# Patient Record
Sex: FEMALE | Race: WHITE | HISPANIC OR LATINO | Employment: FULL TIME | URBAN - METROPOLITAN AREA
[De-identification: names, ages, dates, MRNs, and addresses within clinical notes are randomized per-mention and may not be internally consistent; named-entity substitution may affect disease eponyms.]

---

## 2018-09-11 ENCOUNTER — TRANSCRIBE ORDERS (OUTPATIENT)
Dept: URGENT CARE | Age: 38
End: 2018-09-11

## 2018-09-11 ENCOUNTER — APPOINTMENT (OUTPATIENT)
Dept: URGENT CARE | Age: 38
End: 2018-09-11

## 2018-09-11 ENCOUNTER — APPOINTMENT (OUTPATIENT)
Dept: LAB | Age: 38
End: 2018-09-11

## 2018-09-11 DIAGNOSIS — Z02.1 PRE-EMPLOYMENT EXAMINATION: Primary | ICD-10-CM

## 2018-09-11 DIAGNOSIS — Z02.1 PRE-EMPLOYMENT EXAMINATION: ICD-10-CM

## 2018-09-11 LAB — RUBV IGG SERPL IA-ACNC: 23.9 IU/ML

## 2018-09-11 PROCEDURE — 86480 TB TEST CELL IMMUN MEASURE: CPT

## 2018-09-11 PROCEDURE — 86735 MUMPS ANTIBODY: CPT

## 2018-09-11 PROCEDURE — 86787 VARICELLA-ZOSTER ANTIBODY: CPT

## 2018-09-11 PROCEDURE — 86762 RUBELLA ANTIBODY: CPT

## 2018-09-11 PROCEDURE — 86765 RUBEOLA ANTIBODY: CPT

## 2018-09-11 PROCEDURE — 36415 COLL VENOUS BLD VENIPUNCTURE: CPT

## 2018-09-13 LAB
ANNOTATION COMMENT IMP: NORMAL
GAMMA INTERFERON BACKGROUND BLD IA-ACNC: 0.03 IU/ML
M TB IFN-G BLD-IMP: NEGATIVE
M TB IFN-G CD4+ BCKGRND COR BLD-ACNC: 0 IU/ML
M TB IFN-G CD4+ T-CELLS BLD-ACNC: 0.03 IU/ML
MEV IGG SER QL: ABNORMAL
MITOGEN IGNF BLD-ACNC: 6.46 IU/ML
MUV IGG SER QL: NORMAL
QUANTIFERON-TB GOLD IN TUBE: NORMAL
SERVICE CMNT-IMP: NORMAL
VZV IGG SER IA-ACNC: NORMAL

## 2019-04-02 ENCOUNTER — HOSPITAL ENCOUNTER (EMERGENCY)
Facility: HOSPITAL | Age: 39
Discharge: HOME | End: 2019-04-02
Attending: EMERGENCY MEDICINE | Admitting: EMERGENCY MEDICINE

## 2019-04-02 ENCOUNTER — APPOINTMENT (EMERGENCY)
Dept: RADIOLOGY | Facility: HOSPITAL | Age: 39
End: 2019-04-02

## 2019-04-02 VITALS
WEIGHT: 198.85 LBS | SYSTOLIC BLOOD PRESSURE: 115 MMHG | TEMPERATURE: 97.7 F | OXYGEN SATURATION: 96 % | HEART RATE: 58 BPM | DIASTOLIC BLOOD PRESSURE: 75 MMHG | RESPIRATION RATE: 18 BRPM

## 2019-04-02 DIAGNOSIS — S90.122A CONTUSION OF LESSER TOE OF LEFT FOOT WITHOUT DAMAGE TO NAIL, INITIAL ENCOUNTER: Primary | ICD-10-CM

## 2019-04-02 PROCEDURE — 99283 EMERGENCY DEPT VISIT LOW MDM: CPT | Performed by: EMERGENCY MEDICINE

## 2019-04-02 PROCEDURE — 73630 X-RAY EXAM OF FOOT: CPT

## 2019-04-02 PROCEDURE — 99283 EMERGENCY DEPT VISIT LOW MDM: CPT

## 2019-04-02 RX ORDER — ACETAMINOPHEN 325 MG/1
650 TABLET ORAL ONCE
Status: COMPLETED | OUTPATIENT
Start: 2019-04-02 | End: 2019-04-02

## 2019-04-02 RX ADMIN — ACETAMINOPHEN 650 MG: 325 TABLET, FILM COATED ORAL at 10:43

## 2020-02-05 ENCOUNTER — HOSPITAL ENCOUNTER (EMERGENCY)
Facility: HOSPITAL | Age: 40
Discharge: HOME | End: 2020-02-05
Attending: EMERGENCY MEDICINE | Admitting: EMERGENCY MEDICINE
Payer: COMMERCIAL

## 2020-02-05 VITALS
TEMPERATURE: 99.3 F | RESPIRATION RATE: 18 BRPM | OXYGEN SATURATION: 100 % | SYSTOLIC BLOOD PRESSURE: 129 MMHG | DIASTOLIC BLOOD PRESSURE: 74 MMHG | WEIGHT: 207 LBS | HEART RATE: 79 BPM

## 2020-02-05 DIAGNOSIS — N39.0 UTI (URINARY TRACT INFECTION): Primary | ICD-10-CM

## 2020-02-05 DIAGNOSIS — R10.2 VAGINAL PAIN: ICD-10-CM

## 2020-02-05 LAB
BACTERIA UR QL AUTO: ABNORMAL /HPF
BILIRUB UR QL STRIP: NEGATIVE
CLARITY UR: CLEAR
COLOR UR: YELLOW
EXT PREG TEST URINE: NEGATIVE
EXT. CONTROL ED NAV: NORMAL
GLUCOSE UR STRIP-MCNC: NEGATIVE MG/DL
HGB UR QL STRIP.AUTO: NEGATIVE
KETONES UR STRIP-MCNC: NEGATIVE MG/DL
LEUKOCYTE ESTERASE UR QL STRIP: ABNORMAL
NITRITE UR QL STRIP: NEGATIVE
NON-SQ EPI CELLS URNS QL MICRO: ABNORMAL /HPF
PH UR STRIP.AUTO: 6 [PH]
PROT UR STRIP-MCNC: NEGATIVE MG/DL
RBC #/AREA URNS AUTO: ABNORMAL /HPF
SP GR UR STRIP.AUTO: 1.02 (ref 1–1.03)
UROBILINOGEN UR QL STRIP.AUTO: 0.2 E.U./DL
WBC #/AREA URNS AUTO: ABNORMAL /HPF

## 2020-02-05 PROCEDURE — 99283 EMERGENCY DEPT VISIT LOW MDM: CPT | Performed by: PHYSICIAN ASSISTANT

## 2020-02-05 PROCEDURE — 81025 URINE PREGNANCY TEST: CPT | Performed by: PHYSICIAN ASSISTANT

## 2020-02-05 PROCEDURE — 87529 HSV DNA AMP PROBE: CPT | Performed by: PHYSICIAN ASSISTANT

## 2020-02-05 PROCEDURE — 36415 COLL VENOUS BLD VENIPUNCTURE: CPT | Performed by: PHYSICIAN ASSISTANT

## 2020-02-05 PROCEDURE — 87491 CHLMYD TRACH DNA AMP PROBE: CPT | Performed by: PHYSICIAN ASSISTANT

## 2020-02-05 PROCEDURE — 81001 URINALYSIS AUTO W/SCOPE: CPT | Performed by: PHYSICIAN ASSISTANT

## 2020-02-05 PROCEDURE — 87086 URINE CULTURE/COLONY COUNT: CPT | Performed by: PHYSICIAN ASSISTANT

## 2020-02-05 PROCEDURE — 99283 EMERGENCY DEPT VISIT LOW MDM: CPT

## 2020-02-05 PROCEDURE — 87591 N.GONORRHOEAE DNA AMP PROB: CPT | Performed by: PHYSICIAN ASSISTANT

## 2020-02-05 RX ORDER — CEPHALEXIN 250 MG/1
500 CAPSULE ORAL ONCE
Status: COMPLETED | OUTPATIENT
Start: 2020-02-05 | End: 2020-02-05

## 2020-02-05 RX ORDER — CEPHALEXIN 500 MG/1
500 CAPSULE ORAL EVERY 12 HOURS SCHEDULED
Qty: 14 CAPSULE | Refills: 0 | Status: SHIPPED | OUTPATIENT
Start: 2020-02-05 | End: 2020-02-12

## 2020-02-05 RX ORDER — IBUPROFEN 400 MG/1
400 TABLET ORAL EVERY 6 HOURS PRN
Qty: 12 TABLET | Refills: 0 | Status: SHIPPED | OUTPATIENT
Start: 2020-02-05 | End: 2020-02-08

## 2020-02-05 RX ORDER — PHENAZOPYRIDINE HYDROCHLORIDE 200 MG/1
200 TABLET, FILM COATED ORAL 3 TIMES DAILY
Qty: 6 TABLET | Refills: 0 | Status: SHIPPED | OUTPATIENT
Start: 2020-02-05 | End: 2020-02-07

## 2020-02-05 RX ADMIN — CEPHALEXIN 500 MG: 250 CAPSULE ORAL at 22:11

## 2020-02-06 LAB — BACTERIA UR CULT: NORMAL

## 2020-02-06 NOTE — ED PROVIDER NOTES
History  Chief Complaint   Patient presents with    Vaginal Pain     Pt states "I have a cyst on my lady part " Noticed discomfort on sunday when her period ended  Looked at the area for the first time today  Denies drainage  Denies fevers  Patient is a 40-year-old female with no significant past medical surgical history that presents emergency department with left vaginal discomfort 4 days prior to current ED presentation  Patient denies associated symptomatology  Patient denies vaginal discharge or vaginal bleeding  Patient denies dysuria  Patient verbalizes that she is currently sexually active only with her female partner  Patent reports that she has left-sided vaginal discomfort which she characterizes as intermittent throbbing  Patient denies history current ED presentation left-sided vaginal pain  Patient denies history of abscess  Patient denies palliative factors and affirms provacative factors of pressure of left side of vagina  Patient denies fevers, chills nausea, vomiting, diarrhea, constipation, urinary symptoms  Patient denies recent fall or recent trauma  Patient denies sick contacts recent travel  Patient recent medications  Patient denies chest pain or shortness of breath, and abdominal pain  Vaginal Pain   Location:  Left side of vagina  Severity:  Mild  Onset quality:  Gradual  Duration:  4 days  Timing:  Constant  Progression:  Unchanged  Chronicity:  New  Context:  Discomfort  Relieved by:  Nothing  Worsened by:  Sitting; pressure on left labia  Associated symptoms: no abdominal pain, no chest pain, no congestion, no cough, no diarrhea, no ear pain, no fatigue, no fever, no headaches, no loss of consciousness, no myalgias, no nausea, no rash, no rhinorrhea, no shortness of breath, no sore throat, no vomiting and no wheezing        None       History reviewed  No pertinent past medical history  History reviewed  No pertinent surgical history  History reviewed   No pertinent family history  I have reviewed and agree with the history as documented  Social History     Tobacco Use    Smoking status: Current Every Day Smoker     Types: Cigarettes    Smokeless tobacco: Never Used   Substance Use Topics    Alcohol use: Yes     Frequency: Never     Comment: Socailly    Drug use: Never        Review of Systems   Constitutional: Negative for activity change, appetite change, chills, fatigue and fever  HENT: Negative for congestion, ear pain, postnasal drip, rhinorrhea, sinus pressure, sinus pain, sore throat and tinnitus  Eyes: Negative for photophobia and visual disturbance  Respiratory: Negative for cough, chest tightness, shortness of breath and wheezing  Cardiovascular: Negative for chest pain and palpitations  Gastrointestinal: Negative for abdominal pain, constipation, diarrhea, nausea and vomiting  Genitourinary: Positive for vaginal pain  Negative for difficulty urinating, dysuria, flank pain, frequency and urgency  Musculoskeletal: Negative for back pain, gait problem, myalgias, neck pain and neck stiffness  Skin: Negative for pallor and rash  Allergic/Immunologic: Negative for environmental allergies and food allergies  Neurological: Negative for dizziness, loss of consciousness, weakness, numbness and headaches  Psychiatric/Behavioral: Negative for confusion  All other systems reviewed and are negative  Physical Exam  Physical Exam   Constitutional: She is oriented to person, place, and time  She appears well-developed and well-nourished  She is active and cooperative  Non-toxic appearance  She does not have a sickly appearance  She does not appear ill  HENT:   Head: Normocephalic and atraumatic  Right Ear: Hearing and external ear normal  No drainage, swelling or tenderness  No mastoid tenderness  No decreased hearing is noted  Left Ear: Hearing and external ear normal  No drainage, swelling or tenderness  No mastoid tenderness  No decreased hearing is noted  Nose: Nose normal    Mouth/Throat: Uvula is midline, oropharynx is clear and moist and mucous membranes are normal    Eyes: Pupils are equal, round, and reactive to light  Conjunctivae, EOM and lids are normal  Right eye exhibits no discharge  Left eye exhibits no discharge  Neck: Trachea normal, normal range of motion, full passive range of motion without pain and phonation normal  Neck supple  No JVD present  No tracheal tenderness, no spinous process tenderness and no muscular tenderness present  No neck rigidity  No tracheal deviation and normal range of motion present  Cardiovascular: Normal rate, regular rhythm, normal heart sounds, intact distal pulses and normal pulses  Pulses:       Radial pulses are 2+ on the right side, and 2+ on the left side  Posterior tibial pulses are 2+ on the right side, and 2+ on the left side  Pulmonary/Chest: Effort normal and breath sounds normal  No stridor  She has no decreased breath sounds  She has no wheezes  She has no rhonchi  She has no rales  She exhibits no tenderness, no bony tenderness and no crepitus  Abdominal: Soft  Bowel sounds are normal  She exhibits no distension  There is no tenderness  There is no rigidity, no rebound, no guarding and no CVA tenderness  Hernia confirmed negative in the right inguinal area and confirmed negative in the left inguinal area  Genitourinary: Rectum normal and vagina normal  Pelvic exam was performed with patient supine  No labial fusion  There is no tenderness, lesion or injury on the right labia  There is no tenderness, lesion or injury on the left labia  Cervix exhibits no motion tenderness and no friability  No erythema, tenderness or bleeding in the vagina  No foreign body in the vagina  No signs of injury around the vagina  No vaginal discharge found  Genitourinary Comments: Sarah TREVIZO RN as chaperone during  exam   Musculoskeletal: Normal range of motion  Lymphadenopathy:        Head (right side): No submental, no submandibular, no tonsillar, no preauricular, no posterior auricular and no occipital adenopathy present  Head (left side): No submental, no submandibular, no tonsillar, no preauricular, no posterior auricular and no occipital adenopathy present  She has no cervical adenopathy  Right cervical: No superficial cervical, no deep cervical and no posterior cervical adenopathy present  Left cervical: No superficial cervical, no deep cervical and no posterior cervical adenopathy present  Neurological: She is alert and oriented to person, place, and time  She has normal strength and normal reflexes  No sensory deficit  GCS eye subscore is 4  GCS verbal subscore is 5  GCS motor subscore is 6  Reflex Scores:       Patellar reflexes are 2+ on the right side and 2+ on the left side  Skin: Skin is warm and intact  Capillary refill takes less than 2 seconds  She is not diaphoretic  Psychiatric: She has a normal mood and affect  Her speech is normal and behavior is normal  Judgment and thought content normal  Cognition and memory are normal    Nursing note and vitals reviewed        Vital Signs  ED Triage Vitals [02/05/20 1829]   Temperature Pulse Respirations Blood Pressure SpO2   99 3 °F (37 4 °C) 79 18 129/74 100 %      Temp Source Heart Rate Source Patient Position - Orthostatic VS BP Location FiO2 (%)   Oral Monitor Sitting Left arm --      Pain Score       6           Vitals:    02/05/20 1829   BP: 129/74   Pulse: 79   Patient Position - Orthostatic VS: Sitting         Visual Acuity      ED Medications  Medications   cephalexin (KEFLEX) capsule 500 mg (500 mg Oral Given 2/5/20 2211)       Diagnostic Studies  Results Reviewed     Procedure Component Value Units Date/Time    Urine Microscopic [[id number]]  (Abnormal) Collected:  02/05/20 2059    Lab Status:  Final result Specimen:  Urine, Clean Catch Updated:  02/05/20 2133     RBC, UA 4-10 /hpf      WBC, UA 10-20 /hpf      Epithelial Cells Moderate /hpf      Bacteria, UA Moderate /hpf     Urine culture [[id number]] Collected:  02/05/20 2059    Lab Status: In process Specimen:  Urine, Clean Catch Updated:  02/05/20 2133    UA w Reflex to Microscopic w Reflex to Culture [[id number]]  (Abnormal) Collected:  02/05/20 2059    Lab Status:  Final result Specimen:  Urine, Clean Catch Updated:  02/05/20 2123     Color, UA Yellow     Clarity, UA Clear     Specific Gravity, UA 1 025     pH, UA 6 0     Leukocytes, UA Small     Nitrite, UA Negative     Protein, UA Negative mg/dl      Glucose, UA Negative mg/dl      Ketones, UA Negative mg/dl      Urobilinogen, UA 0 2 E U /dl      Bilirubin, UA Negative     Blood, UA Negative    Chlamydia/GC amplified DNA by PCR [[id number]] Collected:  02/05/20 2059    Lab Status: In process Specimen:  Genital from Cervix Updated:  02/05/20 2109    HSV TYPE 1,2 DNA PCR [[id number]] Collected:  02/05/20 2059    Lab Status:   In process Specimen:  Blood from Arm, Right Updated:  02/05/20 2109    POCT pregnancy, urine [[id number]]  (Normal) Resulted:  02/05/20 2104    Lab Status:  Final result Updated:  02/05/20 2104     EXT PREG TEST UR (Ref: Negative) negative     Control valid                 No orders to display              Procedures  Procedures         ED Course  ED Course as of Feb 05 2221   Wed Feb 05, 2020 1953 6/10 pain - vaginal pain  5 days prior to current ED presentation; "I think that I have a boil on the left side of my vagina "  lmp- ended on Sunday "only lasted 4 days"      1955 +nothing; -pressure and sitting      1958 Patient reports that she does not have an OB/GYN currently                                  MDM  Number of Diagnoses or Management Options  UTI (urinary tract infection): new and does not require workup  Vaginal pain: new and does not require workup     Amount and/or Complexity of Data Reviewed  Clinical lab tests: ordered and reviewed  Review and summarize past medical records: yes    Risk of Complications, Morbidity, and/or Mortality  Presenting problems: low  Diagnostic procedures: low  Management options: low    Patient Progress  Patient progress: stable       Patient is a 20-year-old female with no significant past medical surgical history that presents emergency department with left vaginal discomfort 4 days prior to current ED presentation  Patient denies associated symptomatology  Patient denies vaginal discharge or vaginal bleeding  Patient normotensive; afebrile   exam performed with Sarah ED RN chaperone; benign  Urine pregnancy negative  Urinalysis with likely urinary tract infection; WBC 10-20, epithelial and bacterial cells moderate amount  4-10 RBC, patient verbalized recently ending LMP  GC chlamydia swab in process  Each HSV type 1, 2 DNA PCR in process  Prescribed Keflex, Pyridium, Motrin counseled patient medication administration and side effects  Follow-up with OBGYN  Follow-up with PCP  Follow up emergency department symptoms persist or exacerbate  Patient demonstrates verbal understanding all clinical laboratory findings, discharge instructions, follow-up with verbalized agreement current treatment plan  Disposition  Final diagnoses:   UTI (urinary tract infection)   Vaginal pain     Time reflects when diagnosis was documented in both MDM as applicable and the Disposition within this note     Time User Action Codes Description Comment    2/5/2020 10:15 PM Christine Gould Add [N39 0] UTI (urinary tract infection)     2/5/2020 10:15 PM Christine Gould Add [R10 2] Vaginal pain       ED Disposition     ED Disposition Condition Date/Time Comment    Discharge Stable Wed Feb 5, 2020 10:14 PM [address] Emili Kenny discharge to home/self care              Follow-up Information     Follow up With Specialties Details Why Contact Info Additional [address] [facility] Obstetrics and Gynecology Call in 1 week for further evaluation of symptoms [address]  [place] [phone]  [phone] [place] [address] [facility], [address], [address] [place], [place], [address] Natalia Swanson MD Family Medicine, Obstetrics and Gynecology, Obstetrics, Gynecology Call in 1 week for further evaluation of symptoms [facility] [phone]       [address] Emergency Department Emergency Medicine Go to  As needed [address] [address]  [phone] [facility] ED, [address], [place], [place], [address]          Patient's Medications   Discharge Prescriptions    CEPHALEXIN (KEFLEX) 500 MG CAPSULE    Take 1 capsule (500 mg total) by mouth every 12 (twelve) hours for 7 days       Start Date: 2/5/2020  End Date: 2/12/2020       Order Dose: 500 mg       Quantity: 14 capsule    Refills: 0    IBUPROFEN (MOTRIN) 400 MG TABLET    Take 1 tablet (400 mg total) by mouth every 6 (six) hours as needed for mild pain for up to 3 days       Start Date: 2/5/2020  End Date: 2/8/2020       Order Dose: 400 mg       Quantity: 12 tablet    Refills: 0    PHENAZOPYRIDINE (PYRIDIUM) 200 MG TABLET    Take 1 tablet (200 mg total) by mouth 3 (three) times a day for 2 days       Start Date: 2/5/2020  End Date: 2/7/2020       Order Dose: 200 mg       Quantity: 6 tablet    Refills: 0     No discharge procedures on file      ED Provider  Electronically Signed by           Houston Morillo PA-C  02/05/20 2069

## 2020-02-07 LAB
C TRACH DNA SPEC QL NAA+PROBE: NEGATIVE
N GONORRHOEA DNA SPEC QL NAA+PROBE: NEGATIVE

## 2020-02-10 LAB
HSV1 DNA SPEC QL NAA+PROBE: NEGATIVE
HSV2 DNA SPEC QL NAA+PROBE: NEGATIVE

## 2020-02-26 ENCOUNTER — HOSPITAL ENCOUNTER (EMERGENCY)
Facility: HOSPITAL | Age: 40
Discharge: HOME | End: 2020-02-26
Attending: EMERGENCY MEDICINE | Admitting: EMERGENCY MEDICINE
Payer: COMMERCIAL

## 2020-02-26 VITALS
SYSTOLIC BLOOD PRESSURE: 120 MMHG | WEIGHT: 205.03 LBS | TEMPERATURE: 98.7 F | HEART RATE: 77 BPM | OXYGEN SATURATION: 98 % | DIASTOLIC BLOOD PRESSURE: 76 MMHG | RESPIRATION RATE: 17 BRPM

## 2020-02-26 DIAGNOSIS — K52.9 ACUTE GASTROENTERITIS: Primary | ICD-10-CM

## 2020-02-26 DIAGNOSIS — J02.9 PHARYNGITIS, ACUTE: ICD-10-CM

## 2020-02-26 LAB
BACTERIA UR QL AUTO: ABNORMAL /HPF
BILIRUB UR QL STRIP: NEGATIVE
CLARITY UR: CLEAR
COLOR UR: YELLOW
EXT PREG TEST URINE: NEGATIVE
EXT. CONTROL ED NAV: NORMAL
GLUCOSE UR STRIP-MCNC: NEGATIVE MG/DL
HGB UR QL STRIP.AUTO: ABNORMAL
KETONES UR STRIP-MCNC: NEGATIVE MG/DL
LEUKOCYTE ESTERASE UR QL STRIP: ABNORMAL
MUCOUS THREADS UR QL AUTO: ABNORMAL
NITRITE UR QL STRIP: NEGATIVE
NON-SQ EPI CELLS URNS QL MICRO: ABNORMAL /HPF
PH UR STRIP.AUTO: 5.5 [PH] (ref 4.5–8)
PROT UR STRIP-MCNC: NEGATIVE MG/DL
RBC #/AREA URNS AUTO: ABNORMAL /HPF
SP GR UR STRIP.AUTO: 1.02 (ref 1–1.03)
UROBILINOGEN UR QL STRIP.AUTO: 0.2 E.U./DL
WBC #/AREA URNS AUTO: ABNORMAL /HPF

## 2020-02-26 PROCEDURE — 99284 EMERGENCY DEPT VISIT MOD MDM: CPT

## 2020-02-26 PROCEDURE — 81025 URINE PREGNANCY TEST: CPT | Performed by: EMERGENCY MEDICINE

## 2020-02-26 PROCEDURE — 81001 URINALYSIS AUTO W/SCOPE: CPT

## 2020-02-26 PROCEDURE — 99285 EMERGENCY DEPT VISIT HI MDM: CPT | Performed by: EMERGENCY MEDICINE

## 2020-02-26 RX ORDER — ONDANSETRON 4 MG/1
4 TABLET, ORALLY DISINTEGRATING ORAL ONCE
Status: COMPLETED | OUTPATIENT
Start: 2020-02-26 | End: 2020-02-26

## 2020-02-26 RX ORDER — ONDANSETRON 4 MG/1
4 TABLET, ORALLY DISINTEGRATING ORAL EVERY 6 HOURS PRN
Qty: 20 TABLET | Refills: 0 | Status: SHIPPED | OUTPATIENT
Start: 2020-02-26

## 2020-02-26 RX ADMIN — DEXAMETHASONE SODIUM PHOSPHATE 10 MG: 10 INJECTION, SOLUTION INTRAMUSCULAR; INTRAVENOUS at 07:24

## 2020-02-26 RX ADMIN — ONDANSETRON 4 MG: 4 TABLET, ORALLY DISINTEGRATING ORAL at 07:23

## 2020-02-26 NOTE — ED PROVIDER NOTES
History  Chief Complaint   Patient presents with    Abdominal Pain     c/o generalized abdominal discomfort, N/V, body aches x 2 days  Pt denies fever/chills, or UA/BM complications  Pt recently completed course of antibiotics 2 weeks ago for a UTI     51-year-old female presents to the emergency department for evaluation of nausea with vomiting, generalized body aches and sore throat  Patient states that she woke up Monday morning with nausea and several episodes of nonbloody, nonbilious vomiting  She also had loose stool  She was able to go to work but did not feel well  She has had generalized body aches and fatigue  Yesterday she tolerated a bland diet but developed a mild sore throat  She has not had vomiting since Monday  She does continue with soft stools  Patient's significant other now has a similar GI symptoms  Patient denies dysuria or hematuria  She was recently treated for urinary tract infection but feels that her symptoms have resolved  She is spotting and expects her menses to start tomorrow  Patient denies headache  No ear pain  No nasal congestion  No cough or shortness of breath  Patient has generalized abdominal discomfort but denies focal pain  Patient did have nausea this morning  She is requesting a work note for today        History provided by:  Patient and medical records   used: No    Abdominal Pain   Pain location:  Generalized  Pain quality: dull    Pain radiates to:  Does not radiate  Pain severity:  Mild  Onset quality:  Gradual  Duration:  2 days  Timing:  Constant  Progression:  Improving  Chronicity:  New  Context: recent illness and sick contacts    Context: not diet changes, not suspicious food intake and not trauma    Relieved by:  Nothing  Worsened by:  Nothing  Ineffective treatments:  None tried  Associated symptoms: diarrhea, fatigue, nausea, sore throat and vomiting    Associated symptoms: no anorexia, no chest pain, no chills, no constipation, no cough, no dysuria, no fever, no flatus, no hematemesis and no hematochezia    Risk factors: not pregnant and no recent hospitalization        Prior to Admission Medications   Prescriptions Last Dose Informant Patient Reported? Taking?   ibuprofen (MOTRIN) 400 mg tablet   No No   Sig: Take 1 tablet (400 mg total) by mouth every 6 (six) hours as needed for mild pain for up to 3 days      Facility-Administered Medications: None       History reviewed  No pertinent past medical history  History reviewed  No pertinent surgical history  History reviewed  No pertinent family history  I have reviewed and agree with the history as documented  E-Cigarette/Vaping    E-Cigarette Use Never User      E-Cigarette/Vaping Substances     Social History     Tobacco Use    Smoking status: Current Every Day Smoker     Packs/day: 0 50     Types: Cigarettes    Smokeless tobacco: Never Used   Substance Use Topics    Alcohol use: Yes     Frequency: Never     Comment: Socailly    Drug use: Never       Review of Systems   Constitutional: Positive for fatigue  Negative for chills and fever  HENT: Positive for sore throat  Negative for congestion and ear pain  Respiratory: Negative for cough, choking and chest tightness  Cardiovascular: Negative for chest pain  Gastrointestinal: Positive for abdominal pain, diarrhea, nausea and vomiting  Negative for anorexia, constipation, flatus, hematemesis and hematochezia  Genitourinary: Negative for dysuria and flank pain  Neurological: Negative for weakness  All other systems reviewed and are negative  Physical Exam  Physical Exam   Constitutional: She is oriented to person, place, and time  She appears well-developed and well-nourished  She does not appear ill  No distress  HENT:   Head: Normocephalic  Nose: Nose normal    Mouth/Throat: Posterior oropharyngeal erythema present  No oropharyngeal exudate  Tonsils are 2+ on the right   Tonsils are 2+ on the left  No tonsillar exudate  Eyes: Pupils are equal, round, and reactive to light  Conjunctivae and EOM are normal    Neck: Normal range of motion  Neck supple  Cardiovascular: Normal rate, regular rhythm, normal heart sounds and intact distal pulses  Pulmonary/Chest: Effort normal and breath sounds normal    Abdominal: Soft  Bowel sounds are normal  She exhibits no distension  There is no tenderness  There is no rebound, no guarding, no tenderness at McBurney's point and negative Gilbert's sign  Musculoskeletal: Normal range of motion  She exhibits no edema, tenderness or deformity  Lymphadenopathy:     She has no cervical adenopathy  Neurological: She is alert and oriented to person, place, and time  She has normal strength and normal reflexes  No cranial nerve deficit or sensory deficit  She exhibits normal muscle tone  Coordination and gait normal    Skin: Skin is warm, dry and intact  No rash noted  Psychiatric: She has a normal mood and affect  Her behavior is normal  Judgment and thought content normal    Nursing note and vitals reviewed        Vital Signs  ED Triage Vitals [02/26/20 0615]   Temperature Pulse Respirations Blood Pressure SpO2   98 7 °F (37 1 °C) 77 17 120/76 98 %      Temp Source Heart Rate Source Patient Position - Orthostatic VS BP Location FiO2 (%)   Oral Monitor Lying Right arm --      Pain Score       4           Vitals:    02/26/20 0615   BP: 120/76   Pulse: 77   Patient Position - Orthostatic VS: Lying         Visual Acuity      ED Medications  Medications   ondansetron (ZOFRAN-ODT) dispersible tablet 4 mg (4 mg Oral Given 2/26/20 0723)   dexamethasone 10 mg/mL oral liquid 10 mg 1 mL (10 mg Oral Given 2/26/20 0724)       Diagnostic Studies  Results Reviewed     Procedure Component Value Units Date/Time    Urine Microscopic [[id number]]  (Abnormal) Collected:  02/26/20 0735    Lab Status:  Final result Specimen:  Urine, Clean Catch Updated:  02/26/20 0759     RBC, UA 0-1 /hpf      WBC, UA 4-10 /hpf      Epithelial Cells Moderate /hpf      Bacteria, UA Occasional /hpf      MUCUS THREADS Occasional    POCT pregnancy, urine [[id number]]  (Normal) Resulted:  02/26/20 0736    Lab Status:  Final result Updated:  02/26/20 0736     EXT PREG TEST UR (Ref: Negative) NEGATIVE     Control VALID    Urine Macroscopic, POC [[id number]]  (Abnormal) Collected:  02/26/20 0735    Lab Status:  Final result Specimen:  Urine Updated:  02/26/20 0729     Color, UA Yellow     Clarity, UA Clear     pH, UA 5 5     Leukocytes, UA Small     Nitrite, UA Negative     Protein, UA Negative mg/dl      Glucose, UA Negative mg/dl      Ketones, UA Negative mg/dl      Urobilinogen, UA 0 2 E U /dl      Bilirubin, UA Negative     Blood, UA Moderate     Specific [place], UA 1 020    Narrative:       CLINITEK RESULT                 No orders to display              Procedures  Procedures         ED Course  ED Course as of Mar 02 1612   Wed Feb 26, 2020   0740 Patient resting comfortably  She has tolerated oral intake after taking Zofran  She feels comfortable with discharge  MDM  Number of Diagnoses or Management Options  Acute gastroenteritis: new and requires workup  Pharyngitis, acute: new and requires workup     Amount and/or Complexity of Data Reviewed  Clinical lab tests: ordered and reviewed  Decide to obtain previous medical records or to obtain history from someone other than the patient: yes  Independent visualization of images, tracings, or specimens: yes    Risk of Complications, Morbidity, and/or Mortality  General comments: 80-year-old female presents for nausea vomiting sore throat body aches  Patient admits that her symptoms are actually improving  She does not feel well enough to go to work today  Will treat symptoms with Zofran and dexamethasone  We discussed signs and symptoms to return to the emergency department      Patient Progress  Patient progress: improved        Disposition  Final diagnoses:   Acute gastroenteritis   Pharyngitis, acute     Time reflects when diagnosis was documented in both MDM as applicable and the Disposition within this note     Time User Action Codes Description Comment    2/26/2020  7:40 AM Yulissa Foley [K52 9] Acute gastroenteritis     2/26/2020  7:40 AM Yulissa Foley [J02 9] Pharyngitis, acute       ED Disposition     ED Disposition Condition Date/Time Comment    Discharge Stable Wed Feb 26, 2020  7:40 AM Asia Yanez discharge to home/self care  Follow-up Information     Follow up With Specialties Details Why Contact Info    Donna Ochoa MD Family Medicine, Obstetrics and Gynecology, Obstetrics, Gynecology Schedule an appointment as soon as possible for a visit in 2 days For recheck of current symptoms, As needed [address] [place] [address]  [phone]            Discharge Medication List as of 2/26/2020  7:49 AM      START taking these medications    Details   ondansetron (ZOFRAN-ODT) 4 mg disintegrating tablet Take 1 tablet (4 mg total) by mouth every 6 (six) hours as needed for nausea or vomiting, Starting Wed 2/26/2020, Normal         CONTINUE these medications which have NOT CHANGED    Details   ibuprofen (MOTRIN) 400 mg tablet Take 1 tablet (400 mg total) by mouth every 6 (six) hours as needed for mild pain for up to 3 days, Starting Wed 2/5/2020, Until Sat 2/8/2020, Normal           No discharge procedures on file      PDMP Review     None          ED Provider  Electronically Signed by           Hortencia Alan DO  03/02/20 5911

## 2020-10-22 ENCOUNTER — HOSPITAL ENCOUNTER (EMERGENCY)
Facility: HOSPITAL | Age: 40
Discharge: HOME | End: 2020-10-22
Attending: EMERGENCY MEDICINE | Admitting: EMERGENCY MEDICINE
Payer: COMMERCIAL

## 2020-10-22 VITALS
RESPIRATION RATE: 18 BRPM | HEART RATE: 93 BPM | OXYGEN SATURATION: 100 % | DIASTOLIC BLOOD PRESSURE: 71 MMHG | TEMPERATURE: 98.4 F | SYSTOLIC BLOOD PRESSURE: 127 MMHG

## 2020-10-22 DIAGNOSIS — M54.9 MID BACK PAIN: Primary | ICD-10-CM

## 2020-10-22 DIAGNOSIS — M54.50 ACUTE MIDLINE LOW BACK PAIN WITHOUT SCIATICA: ICD-10-CM

## 2020-10-22 PROCEDURE — 99283 EMERGENCY DEPT VISIT LOW MDM: CPT

## 2020-10-22 PROCEDURE — 99282 EMERGENCY DEPT VISIT SF MDM: CPT | Performed by: EMERGENCY MEDICINE

## 2020-10-22 RX ORDER — ACETAMINOPHEN 325 MG/1
650 TABLET ORAL ONCE
Status: COMPLETED | OUTPATIENT
Start: 2020-10-22 | End: 2020-10-22

## 2020-10-22 RX ORDER — IBUPROFEN 400 MG/1
400 TABLET ORAL ONCE
Status: COMPLETED | OUTPATIENT
Start: 2020-10-22 | End: 2020-10-22

## 2020-10-22 RX ORDER — LIDOCAINE 50 MG/G
2 PATCH TOPICAL ONCE
Status: DISCONTINUED | OUTPATIENT
Start: 2020-10-22 | End: 2020-10-23 | Stop reason: HOSPADM

## 2020-10-22 RX ADMIN — LIDOCAINE 5% 2 PATCH: 700 PATCH TOPICAL at 22:31

## 2020-10-22 RX ADMIN — IBUPROFEN 400 MG: 400 TABLET ORAL at 22:31

## 2020-10-22 RX ADMIN — ACETAMINOPHEN 650 MG: 325 TABLET, FILM COATED ORAL at 22:31

## 2021-12-09 ENCOUNTER — HOSPITAL ENCOUNTER (EMERGENCY)
Facility: HOSPITAL | Age: 41
Discharge: HOME | End: 2021-12-09
Attending: EMERGENCY MEDICINE | Admitting: EMERGENCY MEDICINE
Payer: COMMERCIAL

## 2021-12-09 ENCOUNTER — APPOINTMENT (EMERGENCY)
Dept: RADIOLOGY | Facility: HOSPITAL | Age: 41
End: 2021-12-09
Payer: COMMERCIAL

## 2021-12-09 VITALS
RESPIRATION RATE: 14 BRPM | WEIGHT: 214 LBS | HEART RATE: 78 BPM | SYSTOLIC BLOOD PRESSURE: 123 MMHG | TEMPERATURE: 98.6 F | DIASTOLIC BLOOD PRESSURE: 79 MMHG | OXYGEN SATURATION: 98 % | HEIGHT: 61 IN | BODY MASS INDEX: 40.4 KG/M2

## 2021-12-09 DIAGNOSIS — N39.0 UTI (URINARY TRACT INFECTION): Primary | ICD-10-CM

## 2021-12-09 DIAGNOSIS — R50.9 FEVER: ICD-10-CM

## 2021-12-09 DIAGNOSIS — R05.9 COUGH: ICD-10-CM

## 2021-12-09 DIAGNOSIS — U07.1 COVID-19: ICD-10-CM

## 2021-12-09 LAB
ALBUMIN SERPL BCP-MCNC: 3.1 G/DL (ref 3.5–5)
ALP SERPL-CCNC: 84 U/L (ref 46–116)
ALT SERPL W P-5'-P-CCNC: 22 U/L (ref 12–78)
ANION GAP SERPL CALCULATED.3IONS-SCNC: 5 MMOL/L (ref 4–13)
AST SERPL W P-5'-P-CCNC: 16 U/L (ref 5–45)
BACTERIA UR QL AUTO: ABNORMAL /HPF
BASOPHILS # BLD AUTO: 0.02 THOUSANDS/ΜL (ref 0–0.1)
BASOPHILS NFR BLD AUTO: 0 % (ref 0–1)
BILIRUB SERPL-MCNC: 0.14 MG/DL (ref 0.2–1)
BILIRUB UR QL STRIP: NEGATIVE
BUN SERPL-MCNC: 12 MG/DL (ref 5–25)
CALCIUM ALBUM COR SERPL-MCNC: 9 MG/DL (ref 8.3–10.1)
CALCIUM SERPL-MCNC: 8.3 MG/DL (ref 8.3–10.1)
CHLORIDE SERPL-SCNC: 108 MMOL/L (ref 100–108)
CLARITY UR: CLEAR
CO2 SERPL-SCNC: 24 MMOL/L (ref 21–32)
COLOR UR: YELLOW
COLOR, POC: NORMAL
CREAT SERPL-MCNC: 0.85 MG/DL (ref 0.6–1.3)
EOSINOPHIL # BLD AUTO: 0.01 THOUSAND/ΜL (ref 0–0.61)
EOSINOPHIL NFR BLD AUTO: 0 % (ref 0–6)
ERYTHROCYTE [DISTWIDTH] IN BLOOD BY AUTOMATED COUNT: 17.2 % (ref 11.6–15.1)
EXT PREG TEST URINE: NEGATIVE
EXT. CONTROL ED NAV: NORMAL
FLUAV RNA RESP QL NAA+PROBE: NEGATIVE
FLUBV RNA RESP QL NAA+PROBE: NEGATIVE
GFR SERPL CREATININE-BSD FRML MDRD: 85 ML/MIN/1.73SQ M
GLUCOSE SERPL-MCNC: 84 MG/DL (ref 65–140)
GLUCOSE UR STRIP-MCNC: NEGATIVE MG/DL
HCT VFR BLD AUTO: 35.6 % (ref 34.8–46.1)
HGB BLD-MCNC: 12.1 G/DL (ref 11.5–15.4)
HGB UR QL STRIP.AUTO: ABNORMAL
IMM GRANULOCYTES # BLD AUTO: 0.02 THOUSAND/UL (ref 0–0.2)
IMM GRANULOCYTES NFR BLD AUTO: 0 % (ref 0–2)
KETONES UR STRIP-MCNC: NEGATIVE MG/DL
LEUKOCYTE ESTERASE UR QL STRIP: ABNORMAL
LYMPHOCYTES # BLD AUTO: 0.94 THOUSANDS/ΜL (ref 0.6–4.47)
LYMPHOCYTES NFR BLD AUTO: 16 % (ref 14–44)
MCH RBC QN AUTO: 26.5 PG (ref 26.8–34.3)
MCHC RBC AUTO-ENTMCNC: 34 G/DL (ref 31.4–37.4)
MCV RBC AUTO: 78 FL (ref 82–98)
MONOCYTES # BLD AUTO: 0.96 THOUSAND/ΜL (ref 0.17–1.22)
MONOCYTES NFR BLD AUTO: 17 % (ref 4–12)
NEUTROPHILS # BLD AUTO: 3.85 THOUSANDS/ΜL (ref 1.85–7.62)
NEUTS SEG NFR BLD AUTO: 67 % (ref 43–75)
NITRITE UR QL STRIP: NEGATIVE
NON-SQ EPI CELLS URNS QL MICRO: ABNORMAL /HPF
NRBC BLD AUTO-RTO: 0 /100 WBCS
PH UR STRIP.AUTO: 5.5 [PH] (ref 4.5–8)
PLATELET # BLD AUTO: 331 THOUSANDS/UL (ref 149–390)
PMV BLD AUTO: 9.8 FL (ref 8.9–12.7)
POTASSIUM SERPL-SCNC: 3.5 MMOL/L (ref 3.5–5.3)
PROT SERPL-MCNC: 7.4 G/DL (ref 6.4–8.2)
PROT UR STRIP-MCNC: ABNORMAL MG/DL
RBC # BLD AUTO: 4.56 MILLION/UL (ref 3.81–5.12)
RBC #/AREA URNS AUTO: ABNORMAL /HPF
RSV RNA RESP QL NAA+PROBE: NEGATIVE
SARS-COV-2 RNA RESP QL NAA+PROBE: POSITIVE
SODIUM SERPL-SCNC: 137 MMOL/L (ref 136–145)
SP GR UR STRIP.AUTO: >=1.03 (ref 1–1.03)
UROBILINOGEN UR QL STRIP.AUTO: 0.2 E.U./DL
WBC # BLD AUTO: 5.8 THOUSAND/UL (ref 4.31–10.16)
WBC #/AREA URNS AUTO: ABNORMAL /HPF

## 2021-12-09 PROCEDURE — 96374 THER/PROPH/DIAG INJ IV PUSH: CPT

## 2021-12-09 PROCEDURE — 71045 X-RAY EXAM CHEST 1 VIEW: CPT

## 2021-12-09 PROCEDURE — 81025 URINE PREGNANCY TEST: CPT | Performed by: EMERGENCY MEDICINE

## 2021-12-09 PROCEDURE — 99284 EMERGENCY DEPT VISIT MOD MDM: CPT | Performed by: EMERGENCY MEDICINE

## 2021-12-09 PROCEDURE — 0241U HB NFCT DS VIR RESP RNA 4 TRGT: CPT | Performed by: EMERGENCY MEDICINE

## 2021-12-09 PROCEDURE — 81001 URINALYSIS AUTO W/SCOPE: CPT

## 2021-12-09 PROCEDURE — 87086 URINE CULTURE/COLONY COUNT: CPT

## 2021-12-09 PROCEDURE — 87086 URINE CULTURE/COLONY COUNT: CPT | Performed by: EMERGENCY MEDICINE

## 2021-12-09 PROCEDURE — 96361 HYDRATE IV INFUSION ADD-ON: CPT

## 2021-12-09 PROCEDURE — 36415 COLL VENOUS BLD VENIPUNCTURE: CPT | Performed by: EMERGENCY MEDICINE

## 2021-12-09 PROCEDURE — 85025 COMPLETE CBC W/AUTO DIFF WBC: CPT | Performed by: EMERGENCY MEDICINE

## 2021-12-09 PROCEDURE — 80053 COMPREHEN METABOLIC PANEL: CPT | Performed by: EMERGENCY MEDICINE

## 2021-12-09 PROCEDURE — 99284 EMERGENCY DEPT VISIT MOD MDM: CPT

## 2021-12-09 RX ORDER — IBUPROFEN 400 MG/1
400 TABLET ORAL ONCE
Status: COMPLETED | OUTPATIENT
Start: 2021-12-09 | End: 2021-12-09

## 2021-12-09 RX ORDER — ONDANSETRON 2 MG/ML
4 INJECTION INTRAMUSCULAR; INTRAVENOUS ONCE
Status: COMPLETED | OUTPATIENT
Start: 2021-12-09 | End: 2021-12-09

## 2021-12-09 RX ORDER — CEPHALEXIN 250 MG/1
500 CAPSULE ORAL ONCE
Status: COMPLETED | OUTPATIENT
Start: 2021-12-09 | End: 2021-12-09

## 2021-12-09 RX ORDER — CEPHALEXIN 500 MG/1
500 CAPSULE ORAL EVERY 8 HOURS SCHEDULED
Qty: 30 CAPSULE | Refills: 0 | Status: SHIPPED | OUTPATIENT
Start: 2021-12-09 | End: 2021-12-19

## 2021-12-09 RX ADMIN — IBUPROFEN 400 MG: 400 TABLET, FILM COATED ORAL at 10:17

## 2021-12-09 RX ADMIN — CEPHALEXIN 500 MG: 250 CAPSULE ORAL at 12:16

## 2021-12-09 RX ADMIN — SODIUM CHLORIDE 1000 ML: 0.9 INJECTION, SOLUTION INTRAVENOUS at 09:53

## 2021-12-09 RX ADMIN — ONDANSETRON 4 MG: 2 INJECTION INTRAMUSCULAR; INTRAVENOUS at 10:17

## 2021-12-11 LAB
BACTERIA UR CULT: NORMAL
BACTERIA UR CULT: NORMAL

## 2022-08-05 ENCOUNTER — VBI (OUTPATIENT)
Dept: ADMINISTRATIVE | Facility: OTHER | Age: 42
End: 2022-08-05

## 2022-10-13 ENCOUNTER — HOSPITAL ENCOUNTER (EMERGENCY)
Facility: HOSPITAL | Age: 42
Discharge: HOME | End: 2022-10-13
Attending: EMERGENCY MEDICINE
Payer: COMMERCIAL

## 2022-10-13 ENCOUNTER — APPOINTMENT (EMERGENCY)
Dept: RADIOLOGY | Facility: HOSPITAL | Age: 42
End: 2022-10-13
Payer: COMMERCIAL

## 2022-10-13 VITALS
RESPIRATION RATE: 18 BRPM | DIASTOLIC BLOOD PRESSURE: 58 MMHG | TEMPERATURE: 98.7 F | OXYGEN SATURATION: 100 % | SYSTOLIC BLOOD PRESSURE: 105 MMHG | HEART RATE: 72 BPM

## 2022-10-13 DIAGNOSIS — K63.89 EPIPLOIC APPENDAGITIS: Primary | ICD-10-CM

## 2022-10-13 DIAGNOSIS — R10.32 LEFT LOWER QUADRANT ABDOMINAL PAIN: ICD-10-CM

## 2022-10-13 LAB
ALBUMIN SERPL BCP-MCNC: 3.3 G/DL (ref 3.5–5)
ALP SERPL-CCNC: 90 U/L (ref 46–116)
ALT SERPL W P-5'-P-CCNC: 23 U/L (ref 12–78)
ANION GAP SERPL CALCULATED.3IONS-SCNC: 3 MMOL/L (ref 4–13)
AST SERPL W P-5'-P-CCNC: 26 U/L (ref 5–45)
BACTERIA UR QL AUTO: ABNORMAL /HPF
BASOPHILS # BLD AUTO: 0.04 THOUSANDS/ΜL (ref 0–0.1)
BASOPHILS NFR BLD AUTO: 0 % (ref 0–1)
BILIRUB SERPL-MCNC: 0.41 MG/DL (ref 0.2–1)
BILIRUB UR QL STRIP: NEGATIVE
BUN SERPL-MCNC: 7 MG/DL (ref 5–25)
CALCIUM ALBUM COR SERPL-MCNC: 9.4 MG/DL (ref 8.3–10.1)
CALCIUM SERPL-MCNC: 8.8 MG/DL (ref 8.3–10.1)
CHLORIDE SERPL-SCNC: 112 MMOL/L (ref 96–108)
CLARITY UR: ABNORMAL
CO2 SERPL-SCNC: 23 MMOL/L (ref 21–32)
COLOR UR: YELLOW
CREAT SERPL-MCNC: 0.85 MG/DL (ref 0.6–1.3)
EOSINOPHIL # BLD AUTO: 0.15 THOUSAND/ΜL (ref 0–0.61)
EOSINOPHIL NFR BLD AUTO: 1 % (ref 0–6)
ERYTHROCYTE [DISTWIDTH] IN BLOOD BY AUTOMATED COUNT: 18.5 % (ref 11.6–15.1)
EXT PREG TEST URINE: NEGATIVE
EXT. CONTROL ED NAV: NORMAL
GFR SERPL CREATININE-BSD FRML MDRD: 84 ML/MIN/1.73SQ M
GLUCOSE SERPL-MCNC: 101 MG/DL (ref 65–140)
GLUCOSE UR STRIP-MCNC: NEGATIVE MG/DL
HCT VFR BLD AUTO: 33.7 % (ref 34.8–46.1)
HGB BLD-MCNC: 11.2 G/DL (ref 11.5–15.4)
HGB UR QL STRIP.AUTO: NEGATIVE
IMM GRANULOCYTES # BLD AUTO: 0.03 THOUSAND/UL (ref 0–0.2)
IMM GRANULOCYTES NFR BLD AUTO: 0 % (ref 0–2)
KETONES UR STRIP-MCNC: ABNORMAL MG/DL
LEUKOCYTE ESTERASE UR QL STRIP: ABNORMAL
LYMPHOCYTES # BLD AUTO: 1.63 THOUSANDS/ΜL (ref 0.6–4.47)
LYMPHOCYTES NFR BLD AUTO: 16 % (ref 14–44)
MAGNESIUM SERPL-MCNC: 2.1 MG/DL (ref 1.6–2.6)
MCH RBC QN AUTO: 24.8 PG (ref 26.8–34.3)
MCHC RBC AUTO-ENTMCNC: 33.2 G/DL (ref 31.4–37.4)
MCV RBC AUTO: 75 FL (ref 82–98)
MONOCYTES # BLD AUTO: 0.58 THOUSAND/ΜL (ref 0.17–1.22)
MONOCYTES NFR BLD AUTO: 6 % (ref 4–12)
MUCOUS THREADS UR QL AUTO: ABNORMAL
NEUTROPHILS # BLD AUTO: 8.11 THOUSANDS/ΜL (ref 1.85–7.62)
NEUTS SEG NFR BLD AUTO: 77 % (ref 43–75)
NITRITE UR QL STRIP: NEGATIVE
NON-SQ EPI CELLS URNS QL MICRO: ABNORMAL /HPF
NRBC BLD AUTO-RTO: 0 /100 WBCS
PH UR STRIP.AUTO: 6 [PH]
PLATELET # BLD AUTO: 470 THOUSANDS/UL (ref 149–390)
PMV BLD AUTO: 9.3 FL (ref 8.9–12.7)
POTASSIUM SERPL-SCNC: 3.7 MMOL/L (ref 3.5–5.3)
PROT SERPL-MCNC: 7.7 G/DL (ref 6.4–8.4)
PROT UR STRIP-MCNC: ABNORMAL MG/DL
RBC # BLD AUTO: 4.51 MILLION/UL (ref 3.81–5.12)
RBC #/AREA URNS AUTO: ABNORMAL /HPF
SODIUM SERPL-SCNC: 138 MMOL/L (ref 135–147)
SP GR UR STRIP.AUTO: 1.02 (ref 1–1.03)
UROBILINOGEN UR STRIP-ACNC: <2 MG/DL
WBC # BLD AUTO: 10.54 THOUSAND/UL (ref 4.31–10.16)
WBC #/AREA URNS AUTO: ABNORMAL /HPF

## 2022-10-13 PROCEDURE — 87086 URINE CULTURE/COLONY COUNT: CPT

## 2022-10-13 PROCEDURE — 83735 ASSAY OF MAGNESIUM: CPT

## 2022-10-13 PROCEDURE — 85025 COMPLETE CBC W/AUTO DIFF WBC: CPT

## 2022-10-13 PROCEDURE — 80053 COMPREHEN METABOLIC PANEL: CPT

## 2022-10-13 PROCEDURE — 96360 HYDRATION IV INFUSION INIT: CPT

## 2022-10-13 PROCEDURE — 74177 CT ABD & PELVIS W/CONTRAST: CPT

## 2022-10-13 PROCEDURE — 36415 COLL VENOUS BLD VENIPUNCTURE: CPT

## 2022-10-13 PROCEDURE — 87591 N.GONORRHOEAE DNA AMP PROB: CPT

## 2022-10-13 PROCEDURE — 81001 URINALYSIS AUTO W/SCOPE: CPT

## 2022-10-13 PROCEDURE — 99284 EMERGENCY DEPT VISIT MOD MDM: CPT

## 2022-10-13 PROCEDURE — 81025 URINE PREGNANCY TEST: CPT

## 2022-10-13 PROCEDURE — 87491 CHLMYD TRACH DNA AMP PROBE: CPT

## 2022-10-13 PROCEDURE — G1004 CDSM NDSC: HCPCS

## 2022-10-13 RX ADMIN — SODIUM CHLORIDE 1000 ML: 0.9 INJECTION, SOLUTION INTRAVENOUS at 09:54

## 2022-10-13 RX ADMIN — IOHEXOL 95 ML: 350 INJECTION, SOLUTION INTRAVENOUS at 11:29

## 2022-10-13 NOTE — DISCHARGE INSTRUCTIONS
CT abdomen pelvis with contrast: Acute epiploic appendagitis related to the sigmoid colon  This is a benign self-limited inflammatory process, usually requiring only conservative management , Suspected myomatous uterus  Consider follow-up ultrasound for further evaluation  ,

## 2022-10-13 NOTE — Clinical Note
Tejal Morgan was seen and treated in our emergency department on 10/13/2022  No restrictions            Diagnosis:     Diomara    She may return on this date: If you have any questions or concerns, please don't hesitate to call        Jody Fontana PA-C    ______________________________           _______________          _______________  Hospital Representative                              Date                                Time

## 2022-10-13 NOTE — ED PROVIDER NOTES
History  Chief Complaint   Patient presents with   • Abdominal Pain     States pain feels like on L "edge of gut" / L lower pelvis; states came on suddently 10/12 while at work, hurts with movement; states feels like needs to pee, but only a little comes out; states no changes to urine colour     Patient is a 49-year-old female coming in for complaint of left lower quadrant abdominal pain  Patient is in no acute distress at this time, states that she does have a feeling needed P, but no dysuria, no hematuria, no nausea, no vomiting, no significant past medical history  No history of diverticulitis, no hematochezia, no diarrhea      History provided by:  Patient   used: No    Abdominal Pain  Pain location:  LLQ  Pain severity:  Mild  Duration:  1 day  Associated symptoms: no chest pain, no chills, no diarrhea, no dysuria, no fatigue, no fever, no flatus, no hematochezia, no hematuria, no melena, no nausea, no shortness of breath and no vomiting        Prior to Admission Medications   Prescriptions Last Dose Informant Patient Reported? Taking?   ibuprofen (MOTRIN) 400 mg tablet   No No   Sig: Take 1 tablet (400 mg total) by mouth every 6 (six) hours as needed for mild pain for up to 3 days   ondansetron (ZOFRAN-ODT) 4 mg disintegrating tablet   No No   Sig: Take 1 tablet (4 mg total) by mouth every 6 (six) hours as needed for nausea or vomiting      Facility-Administered Medications: None       History reviewed  No pertinent past medical history  History reviewed  No pertinent surgical history  History reviewed  No pertinent family history  I have reviewed and agree with the history as documented      E-Cigarette/Vaping   • E-Cigarette Use Never User      E-Cigarette/Vaping Substances     Social History     Tobacco Use   • Smoking status: Current Every Day Smoker     Packs/day: 0 50     Types: Cigarettes   • Smokeless tobacco: Current User   Vaping Use   • Vaping Use: Never used   Substance Use Topics   • Alcohol use: Yes     Comment: Socailly   • Drug use: Never       Review of Systems   Constitutional: Negative  Negative for chills, fatigue and fever  HENT: Negative  Eyes: Negative  Respiratory: Negative  Negative for chest tightness and shortness of breath  Cardiovascular: Negative  Negative for chest pain  Gastrointestinal: Positive for abdominal pain  Negative for blood in stool, diarrhea, flatus, hematochezia, melena, nausea and vomiting  Genitourinary: Negative  Negative for decreased urine volume, dysuria, flank pain, hematuria and urgency  Musculoskeletal: Negative  Skin: Negative  Neurological: Negative  Psychiatric/Behavioral: Negative  Physical Exam  Physical Exam  Vitals reviewed  Constitutional:       Appearance: Normal appearance  She is normal weight  HENT:      Head: Normocephalic and atraumatic  Right Ear: External ear normal       Left Ear: External ear normal       Nose: Nose normal    Eyes:      Conjunctiva/sclera: Conjunctivae normal    Cardiovascular:      Rate and Rhythm: Normal rate  Pulmonary:      Effort: Pulmonary effort is normal    Abdominal:      Palpations: Abdomen is soft  Tenderness: There is abdominal tenderness in the left lower quadrant  There is no guarding  Comments: No tenderness on palpation of the suprapubic region   Musculoskeletal:         General: Normal range of motion  Cervical back: Normal range of motion  Skin:     General: Skin is warm and dry  Neurological:      Mental Status: She is alert           Vital Signs  ED Triage Vitals   Temperature Pulse Respirations Blood Pressure SpO2   10/13/22 0848 10/13/22 0848 10/13/22 0848 10/13/22 0848 10/13/22 0848   98 7 °F (37 1 °C) 82 18 129/60 97 %      Temp Source Heart Rate Source Patient Position - Orthostatic VS BP Location FiO2 (%)   10/13/22 0848 10/13/22 0848 10/13/22 1144 10/13/22 1144 --   Oral Monitor Lying Right arm       Pain Score 10/13/22 0848       7           Vitals:    10/13/22 0848 10/13/22 1144   BP: 129/60 105/58   Pulse: 82 72   Patient Position - Orthostatic VS:  Lying         Visual Acuity      ED Medications  Medications   sodium chloride 0 9 % bolus 1,000 mL (0 mL Intravenous Stopped 10/13/22 1054)   iohexol (OMNIPAQUE) 350 MG/ML injection (SINGLE-DOSE) 100 mL (95 mL Intravenous Given 10/13/22 1129)       Diagnostic Studies  Results Reviewed     Procedure Component Value Units Date/Time    Chlamydia/GC amplified DNA by PCR [[id number]] Collected: 10/13/22 1134    Lab Status: In process Specimen: Urine, Other Updated: 10/13/22 1139    Trichomonas vaginalis/Mycoplasma genitalium PCR [[id number]]     Lab Status: No result Specimen: Urine, Clean Catch     Urine Microscopic [[id number]]  (Abnormal) Collected: 10/13/22 0954    Lab Status: Final result Specimen: Urine, Clean Catch Updated: 10/13/22 1015     RBC, UA 1-2 /hpf      WBC, UA Innumerable /hpf      Epithelial Cells Occasional /hpf      Bacteria, UA None Seen /hpf      MUCUS THREADS Moderate    Urine culture [[id number]] Collected: 10/13/22 0954    Lab Status:  In process Specimen: Urine, Clean Catch Updated: 10/13/22 1015    UA w Reflex to Microscopic w Reflex to Culture [[id number]]  (Abnormal) Collected: 10/13/22 0954    Lab Status: Final result Specimen: Urine, Clean Catch Updated: 10/13/22 1012     Color, UA Yellow     Clarity, UA Turbid     Specific [place], UA 1 023     pH, UA 6 0     Leukocytes, UA Large     Nitrite, UA Negative     Protein, UA Trace mg/dl      Glucose, UA Negative mg/dl      Ketones, UA 10 (1+) mg/dl      Urobilinogen, UA <2 0 mg/dl      Bilirubin, UA Negative     Occult Blood, UA Negative    POCT pregnancy, urine [[id number]]  (Normal) Resulted: 10/13/22 1010    Lab Status: Final result Updated: 10/13/22 1010     EXT PREG TEST UR (Ref: Negative) negative     Control valid    Comprehensive metabolic panel [[id number]]  (Abnormal) Collected: 10/13/22 0910 Lab Status: Final result Specimen: Blood from Arm, Left Updated: 10/13/22 0947     Sodium 138 mmol/L      Potassium 3 7 mmol/L      Chloride 112 mmol/L      CO2 23 mmol/L      ANION GAP 3 mmol/L      BUN 7 mg/dL      Creatinine 0 85 mg/dL      Glucose 101 mg/dL      Calcium 8 8 mg/dL      Corrected Calcium 9 4 mg/dL      AST 26 U/L      ALT 23 U/L      Alkaline Phosphatase 90 U/L      Total Protein 7 7 g/dL      Albumin 3 3 g/dL      Total Bilirubin 0 41 mg/dL      eGFR 84 ml/min/1 73sq m     Narrative:      National Kidney Disease Foundation guidelines for Chronic Kidney Disease (CKD):   •  Stage 1 with normal or high GFR (GFR > 90 mL/min/1 73 square meters)  •  Stage 2 Mild CKD (GFR = 60-89 mL/min/1 73 square meters)  •  Stage 3A Moderate CKD (GFR = 45-59 mL/min/1 73 square meters)  •  Stage 3B Moderate CKD (GFR = 30-44 mL/min/1 73 square meters)  •  Stage 4 Severe CKD (GFR = 15-29 mL/min/1 73 square meters)  •  Stage 5 End Stage CKD (GFR <15 mL/min/1 73 square meters)  Note: GFR calculation is accurate only with a steady state creatinine    Magnesium [[id number]]  (Normal) Collected: 10/13/22 0910    Lab Status: Final result Specimen: Blood from Arm, Left Updated: 10/13/22 0947     Magnesium 2 1 mg/dL     CBC and differential [[id number]]  (Abnormal) Collected: 10/13/22 0910    Lab Status: Final result Specimen: Blood from Arm, Left Updated: 10/13/22 0928     WBC 10 54 Thousand/uL      RBC 4 51 Million/uL      Hemoglobin 11 2 g/dL      Hematocrit 33 7 %      MCV 75 fL      MCH 24 8 pg      MCHC 33 2 g/dL      RDW 18 5 %      MPV 9 3 fL      Platelets 590 Thousands/uL      nRBC 0 /100 WBCs      Neutrophils Relative 77 %      Immat GRANS % 0 %      Lymphocytes Relative 16 %      Monocytes Relative 6 %      Eosinophils Relative 1 %      Basophils Relative 0 %      Neutrophils Absolute 8 11 Thousands/µL      Immature Grans Absolute 0 03 Thousand/uL      Lymphocytes Absolute 1 63 Thousands/µL      Monocytes Absolute 0 58 Thousand/µL      Eosinophils Absolute 0 15 Thousand/µL      Basophils Absolute 0 04 Thousands/µL                  CT abdomen pelvis with contrast   Final Result by Soumya Velazco MD (10/13 1150)      Acute epiploic appendagitis related to the sigmoid colon  This is a benign self-limited inflammatory process, usually requiring only conservative management  Suspected myomatous uterus  Consider follow-up ultrasound for further evaluation  The study was marked in [facility] for immediate notification and follow-up regarding the uterine findings  Workstation performed: [id number]                    Procedures  Procedures         ED Course  ED Course as of 10/13/22 1356   Thu Oct 13, 2022   1013 Leukocytes, UA(!): Large   1016 WBC, UA(!): Innumerable   1152 CT abdomen pelvis with contrast   1153 CT abdomen pelvis with contrast  Acute epiploic appendagitis related to the sigmoid colon  This is a benign self-limited inflammatory process, usually requiring only conservative management      Suspected myomatous uterus  Consider follow-up ultrasound for further evaluation  MDM  Number of Diagnoses or Management Options  Epiploic appendagitis: new and does not require workup  Left lower quadrant abdominal pain: new and does not require workup  Diagnosis management comments: Patient comes in for complaint of left lower quadrant abdominal pain  Patient is in no acute distress this time  No tenderness on palpation of the suprapubic  Patient does have an epiploic appendagitis on CT  I did advise patient that she has an extremely elevated white count, but no bacteria seen, patient continues to deny dysuria, hematuria  Patient states that she is not concerned for STDs at this time, due to no bacteria being seen but an elevated white count, I did order a gonorrhea chlamydia test, which will take 24-72 hours, which I advised patient    I offered the option to treat for STDs at this time, however patient stated that she would like to wait until testing came back  Will not start UTI treatment at this time, as patient has no clinical symptoms, and has no bacteria on UA    Counseling: I had a detailed discussion with the patient and/or guardian regarding: the historical points, exam findings, and any diagnostic results supporting the discharge diagnosis, lab results, radiology results, discharge instructions reviewed with patient and/or family/caregiver and understanding was verbalized  Instructions given to return to the emergency department if symptoms worsen or persist, or if there are any questions or concerns that arise at home       All labs reviewed and utilized in the medical decision making process     All radiology studies independently viewed by me and interpreted by the radiologist     Portions of the record may have been created with voice recognition software   Occasional wrong word or "sound a like" substitutions may have occurred due to the inherent limitations of voice recognition software   Read the chart carefully and recognize, using context, where substitutions have occurred           Amount and/or Complexity of Data Reviewed  Clinical lab tests: ordered and reviewed  Tests in the radiology section of CPT®: ordered and reviewed    Risk of Complications, Morbidity, and/or Mortality  Presenting problems: low  Diagnostic procedures: minimal  Management options: minimal    Patient Progress  Patient progress: stable      Disposition  Final diagnoses:   Epiploic appendagitis   Left lower quadrant abdominal pain     Time reflects when diagnosis was documented in both MDM as applicable and the Disposition within this note     Time User Action Codes Description Comment    10/13/2022 12:02 PM Ginette Saint Add [K63 89] Epiploic appendagitis     10/13/2022 12:02 PM Ginette Saint Add [R10 32] Left lower quadrant abdominal pain       ED Disposition     ED Disposition   Discharge    Condition   Stable    Date/Time   Thu Oct 13, 2022 12:02 PM    Comment   [place] discharge to home/self care  Follow-up Information     Follow up With Specialties Details Why Contact Info Additional Information    Virgen Tapia MD Family Medicine, Obstetrics and Gynecology, Obstetrics, Gynecology   [address] [place] [address]  [address]  Emergency Department Emergency Medicine  As needed, If symptoms worsen [address] [id number] [address] Emergency Department, [address], [address] [facility] [address]          Discharge Medication List as of 10/13/2022 12:03 PM      CONTINUE these medications which have NOT CHANGED    Details   ibuprofen (MOTRIN) 400 mg tablet Take 1 tablet (400 mg total) by mouth every 6 (six) hours as needed for mild pain for up to 3 days, Starting Wed 2/5/2020, Until Sat 2/8/2020, Normal      ondansetron (ZOFRAN-ODT) 4 mg disintegrating tablet Take 1 tablet (4 mg total) by mouth every 6 (six) hours as needed for nausea or vomiting, Starting Wed 2/26/2020, Normal             No discharge procedures on file      PDMP Review     None          ED Provider  Electronically Signed by           Christophe Romo PA-C  10/13/22 3930

## 2022-10-14 LAB
BACTERIA UR CULT: NORMAL
C TRACH DNA SPEC QL NAA+PROBE: NEGATIVE
N GONORRHOEA DNA SPEC QL NAA+PROBE: NEGATIVE

## 2022-11-30 ENCOUNTER — HOSPITAL ENCOUNTER (EMERGENCY)
Facility: HOSPITAL | Age: 42
Discharge: HOME | End: 2022-11-30
Attending: EMERGENCY MEDICINE

## 2022-11-30 VITALS
OXYGEN SATURATION: 99 % | TEMPERATURE: 98.6 F | SYSTOLIC BLOOD PRESSURE: 138 MMHG | HEART RATE: 88 BPM | RESPIRATION RATE: 18 BRPM | DIASTOLIC BLOOD PRESSURE: 82 MMHG

## 2022-11-30 DIAGNOSIS — N76.0 ABSCESS OF VAGINA: Primary | ICD-10-CM

## 2022-11-30 LAB
AMORPH URATE CRY URNS QL MICRO: ABNORMAL
BACTERIA UR QL AUTO: ABNORMAL /HPF
BILIRUB UR QL STRIP: NEGATIVE
CLARITY UR: ABNORMAL
COLOR UR: YELLOW
GLUCOSE UR STRIP-MCNC: NEGATIVE MG/DL
HGB UR QL STRIP.AUTO: NEGATIVE
KETONES UR STRIP-MCNC: ABNORMAL MG/DL
LEUKOCYTE ESTERASE UR QL STRIP: NEGATIVE
MUCOUS THREADS UR QL AUTO: ABNORMAL
NITRITE UR QL STRIP: NEGATIVE
NON-SQ EPI CELLS URNS QL MICRO: ABNORMAL /HPF
PH UR STRIP.AUTO: 5.5 [PH]
PROT UR STRIP-MCNC: ABNORMAL MG/DL
RBC #/AREA URNS AUTO: ABNORMAL /HPF
SP GR UR STRIP.AUTO: 1.04 (ref 1–1.03)
UROBILINOGEN UR STRIP-ACNC: <2 MG/DL
WBC #/AREA URNS AUTO: ABNORMAL /HPF

## 2022-11-30 RX ORDER — CEPHALEXIN 500 MG/1
500 CAPSULE ORAL EVERY 12 HOURS SCHEDULED
Qty: 14 CAPSULE | Refills: 0 | Status: SHIPPED | OUTPATIENT
Start: 2022-11-30 | End: 2022-12-07

## 2022-11-30 RX ORDER — LORAZEPAM 1 MG/1
1 TABLET ORAL ONCE
Status: COMPLETED | OUTPATIENT
Start: 2022-11-30 | End: 2022-11-30

## 2022-11-30 RX ORDER — LIDOCAINE HYDROCHLORIDE 10 MG/ML
5 INJECTION, SOLUTION EPIDURAL; INFILTRATION; INTRACAUDAL; PERINEURAL ONCE
Status: COMPLETED | OUTPATIENT
Start: 2022-11-30 | End: 2022-11-30

## 2022-11-30 RX ADMIN — LIDOCAINE HYDROCHLORIDE 5 ML: 10 INJECTION, SOLUTION EPIDURAL; INFILTRATION; INTRACAUDAL; PERINEURAL at 02:03

## 2022-11-30 RX ADMIN — LORAZEPAM 1 MG: 1 TABLET ORAL at 02:41

## 2022-11-30 NOTE — ED ATTENDING ATTESTATION
11/29/2022  IWillis DO, saw and evaluated the patient  I have discussed the patient with the resident/non-physician practitioner and agree with the resident's/non-physician practitioner's findings, Plan of Care, and MDM as documented in the resident's/non-physician practitioner's note, except where noted  All available labs and Radiology studies were reviewed  I was present for key portions of any procedure(s) performed by the resident/non-physician practitioner and I was immediately available to provide assistance  At this point I agree with the current assessment done in the Emergency Department  I have conducted an independent evaluation of this patient a history and physical is as follows:    History provided by:  Patient  Vaginal Pain  Location:  Left labial swelling and pain   Severity:  Mild  Onset quality:  Gradual  Timing:  Constant  Progression:  Worsening  Chronicity:  New  Associated symptoms: no abdominal pain, no chest pain, no cough, no diarrhea, no fever, no headaches, no myalgias, no nausea, no rash, no rhinorrhea, no shortness of breath, no sore throat and no wheezing     are as follows /82   Pulse 88   Temp 98 6 °F (37 °C) (Oral)   Resp 18   SpO2 99%   Review of Systems Review of Systems   Constitutional: Negative for chills and fever  HENT: Negative for rhinorrhea, sore throat and trouble swallowing  Eyes: Negative for pain  Respiratory: Negative for cough, shortness of breath, wheezing and stridor  Cardiovascular: Negative for chest pain and leg swelling  Gastrointestinal: Negative for abdominal pain, diarrhea and nausea  Endocrine: Negative for polyuria  Genitourinary: Positive for vaginal pain  Negative for dysuria, flank pain and urgency  Musculoskeletal: Negative for joint swelling, myalgias and neck stiffness  Skin: Negative for rash  Allergic/Immunologic: Negative for immunocompromised state     Neurological: Negative for dizziness, syncope, weakness, numbness and headaches  Psychiatric/Behavioral: Negative for confusion and suicidal ideas  All other systems reviewed and are negative  Physical Exam remarkable for Physical Exam  Vitals and nursing note reviewed  Constitutional:       Appearance: Normal appearance  She is well-developed  HENT:      Head: Normocephalic and atraumatic  Nose: Nose normal       Mouth/Throat:      Mouth: Mucous membranes are moist    Eyes:      Extraocular Movements: Extraocular movements intact  Pupils: Pupils are equal, round, and reactive to light  Cardiovascular:      Rate and Rhythm: Normal rate and regular rhythm  Heart sounds: No murmur heard  No friction rub  Pulmonary:      Effort: No respiratory distress  Breath sounds: Normal breath sounds  No wheezing or rales  Abdominal:      General: Bowel sounds are normal  There is no distension  Palpations: Abdomen is soft  Tenderness: There is no abdominal tenderness  Genitourinary:     Vagina: No vaginal discharge  Musculoskeletal:         General: No tenderness  Normal range of motion  Cervical back: Normal range of motion and neck supple  Skin:     General: Skin is warm  Findings: No rash  Neurological:      Mental Status: She is alert and oriented to person, place, and time  Psychiatric:         Mood and Affect: Mood normal       Work up and treatment plan discussed with Treatment Team: Registered Nurse: Laya Mak RN; Registered Nurse: Sharmin Gutierrez RN; Resident: Babatunde Khoury MD and agreed upon plan  MDM  Number of Diagnoses or Management Options  Abscess of vagina: new, needed workup  Diagnosis management comments: 41-year-old female presents emergency department with left labial swelling and discomfort and pain    Concern for possible cyst at this point time/abscess and infection, patient was numbed up appropriately with lidocaine and stab incision made with 11 blade, slight amount of purulent drainage and bloody discharge removed at this point time will place on antibiotics for possible abscess last cellulitis, plan outpatient management followup 0 with OBGYN  Pt re-examined and evaluated after testing and treatment  Spoke with the patient and feeling improved and sxs have resolved  Will discharge home with close f/u with pcp and instructed to return to the ED if sxs worsen or continue  Pt agrees with the plan for discharge and feels comfortable to go home with proper f/u  Advised to return for worsening or additional problems  Diagnostic tests were reviewed and questions answered  Diagnosis, care plan and treatment options were discussed  The patient understand instructions and will follow up as directed  Counseling: I had a detailed discussion with the patient and/or guardian regarding: the historical points, exam findings, and any diagnostic results supporting the discharge diagnosis, lab results, radiology results, discharge instructions reviewed with patient and/or family/caregiver and understanding was verbalized  Instructions given to return to the emergency department if symptoms worsen or persist, or if there are any questions or concerns that arise at home  All labs reviewed and utilized in the medical decision making process    All radiology studies independently viewed by me and interpreted by the radiologist        Lab Results: Lab Results: I have personally reviewed pertinent lab results  Imaging: I have personally reviewed pertinent reports      EKG, Pathology, and Other Studies: I have personally reviewed pertinent films in PACS    Clinical Impression:    Final diagnoses:   Abscess of vagina         Disposition    discharged           New Prescriptions:    Discharge Medication List as of 11/30/2022  3:39 AM      START taking these medications    Details   cephalexin (KEFLEX) 500 mg capsule Take 1 capsule (500 mg total) by mouth every 12 (twelve) hours for 7 days, Starting Wed 11/30/2022, Until Wed 12/7/2022, Normal                  Follow-up Instructions:    [address] [place] [address] Emergency Department  [address] [id number] [phone]  Go to   If symptoms worsen    MD Natalia Kent [address] [place] [id number]  [phone]    Go to   If symptoms worsen    [address] [id number] [phone]  Schedule an appointment as soon as possible for a visit           ED Course         Critical Care Time  Procedures

## 2022-11-30 NOTE — ED PROVIDER NOTES
History  Chief Complaint   Patient presents with   • Vaginal Pain     Left labia minora is inflamed and painful since last week but worse tonight     Patient is 27-year-old female presenting for vaginal pain  No significant past medical history  Patient states approximately 3-4 days ago began experiencing worsening left-sided vaginal pain  Patient states she feels a small swelling by left labia minora  Patient denies any dysuria, oliguria  Patient states she is sexually active with 1 partner  Denies any concerns of STI  Patient denies any systemic symptoms, no fevers/chills, nausea/vomiting  On exam, patient has small 1-2 cm abscess/cyst on left vaginal wall  Is tender to palpation  No apparent drainage or pus  No other rash, cellulitic changes, or discharge noted  Rest of physical exam is benign  Prior to Admission Medications   Prescriptions Last Dose Informant Patient Reported? Taking?   ibuprofen (MOTRIN) 400 mg tablet   No No   Sig: Take 1 tablet (400 mg total) by mouth every 6 (six) hours as needed for mild pain for up to 3 days   ondansetron (ZOFRAN-ODT) 4 mg disintegrating tablet   No No   Sig: Take 1 tablet (4 mg total) by mouth every 6 (six) hours as needed for nausea or vomiting      Facility-Administered Medications: None       History reviewed  No pertinent past medical history  History reviewed  No pertinent surgical history  History reviewed  No pertinent family history  I have reviewed and agree with the history as documented  E-Cigarette/Vaping   • E-Cigarette Use Never User      E-Cigarette/Vaping Substances     Social History     Tobacco Use   • Smoking status: Every Day     Packs/day: 0 50     Types: Cigarettes   • Smokeless tobacco: Current   Vaping Use   • Vaping Use: Never used   Substance Use Topics   • Alcohol use: Yes     Comment: Socailly   • Drug use: Never        Review of Systems   Constitutional: Negative  HENT: Negative  Eyes: Negative  Respiratory: Negative  Cardiovascular: Negative  Gastrointestinal: Negative  Endocrine: Negative  Genitourinary:        Left-sided vaginal pain/swelling  Musculoskeletal: Negative  Skin: Negative  Allergic/Immunologic: Negative  Neurological: Negative  Hematological: Negative  Psychiatric/Behavioral: Negative  Physical Exam  ED Triage Vitals [11/30/22 0005]   Temperature Pulse Respirations Blood Pressure SpO2   98 6 °F (37 °C) 88 18 138/82 99 %      Temp Source Heart Rate Source Patient Position - Orthostatic VS BP Location FiO2 (%)   Oral -- -- -- --      Pain Score       8             Orthostatic Vital Signs  Vitals:    11/30/22 0005   BP: 138/82   Pulse: 88       Physical Exam  Vitals and nursing note reviewed  Constitutional:       Appearance: Normal appearance  She is obese  HENT:      Head: Normocephalic and atraumatic  Right Ear: Tympanic membrane, ear canal and external ear normal       Left Ear: Tympanic membrane, ear canal and external ear normal       Nose: Nose normal       Mouth/Throat:      Mouth: Mucous membranes are moist       Pharynx: Oropharynx is clear  Eyes:      Extraocular Movements: Extraocular movements intact  Conjunctiva/sclera: Conjunctivae normal       Pupils: Pupils are equal, round, and reactive to light  Cardiovascular:      Rate and Rhythm: Normal rate and regular rhythm  Pulses: Normal pulses  Heart sounds: Normal heart sounds  Pulmonary:      Effort: Pulmonary effort is normal       Breath sounds: Normal breath sounds  Abdominal:      General: Abdomen is flat  Bowel sounds are normal       Palpations: Abdomen is soft  Genitourinary:     Comments: Patient has small left-sided vaginal cyst/abscess  It is tender palpation, no apparent drainage or cellulitic changes  No other rashes or erythema noted on pelvic exam   Musculoskeletal:         General: Normal range of motion        Cervical back: Normal range of motion and neck supple  Skin:     General: Skin is warm and dry  Capillary Refill: Capillary refill takes less than 2 seconds  Neurological:      General: No focal deficit present  Mental Status: She is alert and oriented to person, place, and time  Psychiatric:         Mood and Affect: Mood normal          Behavior: Behavior normal          Thought Content: Thought content normal          Judgment: Judgment normal          ED Medications  Medications   lidocaine (PF) (XYLOCAINE-MPF) 1 % injection 5 mL (5 mL Infiltration Given by Other 11/30/22 0203)   LORazepam (ATIVAN) tablet 1 mg (1 mg Oral Given 11/30/22 0241)       Diagnostic Studies  Results Reviewed     Procedure Component Value Units Date/Time    Urine Microscopic [[id number]]  (Abnormal) Collected: 11/30/22 0229    Lab Status: Final result Specimen: Urine, Clean Catch Updated: 11/30/22 0431     RBC, UA 10-20 /hpf      WBC, UA 2-4 /hpf      Epithelial Cells Occasional /hpf      Bacteria, UA Occasional /hpf      MUCUS THREADS Innumerable     Amorphous Crystals, UA Occasional    UA (URINE) with reflex to Scope [[id number]]  (Abnormal) Collected: 11/30/22 0229    Lab Status: Final result Specimen: Urine, Clean Catch Updated: 11/30/22 0400     Color, UA Yellow     Clarity, UA Turbid     Specific [place], UA 1 040     pH, UA 5 5     Leukocytes, UA Negative     Nitrite, UA Negative     Protein, UA 30 (1+) mg/dl      Glucose, UA Negative mg/dl      Ketones, UA Trace mg/dl      Urobilinogen, UA <2 0 mg/dl      Bilirubin, UA Negative     Occult Blood, UA Negative    Chlamydia/GC amplified DNA by PCR [[id number]] Collected: 11/30/22 0229    Lab Status:  In process Specimen: Urine, Other Updated: 11/30/22 0233                 No orders to display         Procedures  Incision and drain    Date/Time: 11/30/2022 4:57 AM  Performed by: Justen Cisneros MD  Authorized by: Justen Cisneros MD   [place] Protocol:  Procedure performed by: (Dr La Romero)  Consent: Verbal consent obtained  Risks and benefits: risks, benefits and alternatives were discussed  Consent given by: patient  Time out: Immediately prior to procedure a "time out" was called to verify the correct patient, procedure, equipment, support staff and site/side marked as required  Timeout called at: 11/30/2022 2:22 AM   Patient understanding: patient states understanding of the procedure being performed  Patient consent: the patient's understanding of the procedure matches consent given  Procedure consent: procedure consent matches procedure scheduled  Relevant documents: relevant documents present and verified  Test results: test results available and properly labeled  Site marked: the operative site was marked  Radiology Images displayed and confirmed  If images not available, report reviewed: imaging studies available  Required items: required blood products, implants, devices, and special equipment available  Patient identity confirmed: verbally with patient and arm band      Patient location:  ED  Location:     Type:  Abscess    Size:  1-2 cm    Location:  Anogenital    Anogenital location: Vaginal   Sedation:     Sedation type: Patient given 1 mg Ativan for anxiety  Anesthesia (see MAR for exact dosages): Anesthesia method:  Local infiltration    Local anesthetic:  Lidocaine 1% w/o epi  Procedure details:     Complexity:  Simple    Needle aspiration: no      Incision types:  Stab incision    Aspiration type: puncture aspiration      Scalpel blade:  11    Approach:  Open    Incision depth:  Subcutaneous and submucosal    Wound management:  Probed and deloculated and extensive cleaning    Drainage:  Bloody    Drainage amount:  Scant    Wound treatment:  Wound left open    Packing materials:  None  Post-procedure details:     Patient tolerance of procedure:   Tolerated well, no immediate complications          ED Course  ED Course as of 11/30/22 0503   Wed Nov 30, 2022   0147 External pelvic exam done w/ chaperone - Rene Hiss  - Findings consistent w/ barthlin's cyst                             SBIRT 22yo+    Flowsheet Row Most Recent Value   SBIRT (23 yo +)    In order to provide better care to our patients, we are screening all of our patients for alcohol and drug use  Would it be okay to ask you these screening questions? No Filed at: 11/30/2022 0023                MDM  Number of Diagnoses or Management Options  Abscess of vagina: new and requires workup  Diagnosis management comments: Patient is 25-year-old female presenting for left vaginal abscess  Ddx: Vaginal Abscess, Bartholin cyst  Based on patient presentation and physical exam findings, concern for vaginal abscess or Bartholin's cyst   Will also obtain UA and GC chlamydia to evaluate for UTI/STI  Plan for I and D of abscess as well as plan for discharge with home course antibiotics and OBGYN follow-up is necessary  Ativan will be given for patient anxiety due to concerns over I and D process   -patient tolerated I and D well  Ready for discharge with 7 day course of antibiotics         Amount and/or Complexity of Data Reviewed  Clinical lab tests: ordered and reviewed  Review and summarize past medical records: yes  Independent visualization of images, tracings, or specimens: yes    Risk of Complications, Morbidity, and/or Mortality  Presenting problems: low  Diagnostic procedures: low  Management options: low    Patient Progress  Patient progress: stable      Disposition  Final diagnoses:   Abscess of vagina     Time reflects when diagnosis was documented in both MDM as applicable and the Disposition within this note     Time User Action Codes Description Comment    11/30/2022  1:43 AM Tenna Suazo Add [N75 0] Bartholin cyst     11/30/2022  3:36 AM Tenna Suazo Add [N76 0] Abscess of vagina     11/30/2022  3:36 AM Tenna Suazo Modify [N76 0] Abscess of vagina     11/30/2022  3:36 AM Tenna Suazo Remove [N75 0] Bartholin cyst       ED Disposition     ED Disposition   Discharge    Condition   Stable    Date/Time   Wed Nov 30, 2022  3:35 AM    Comment   Mat Mejia discharge to home/self care  Follow-up Information     Follow up With Specialties Details Why Contact Info Additional [address] Emergency Department Emergency Medicine Go to  If symptoms worsen [address] Emergency Department, [address], [address]    Mary Maldonado MD Family Medicine, Obstetrics and Gynecology, Obstetrics, Gynecology Go to  If symptoms worsen [address] [place] [address]  [address] Obstetrics and Gynecology Schedule an appointment as soon as possible for a visit   [facility]  [phone]           Discharge Medication List as of 11/30/2022  3:39 AM      START taking these medications    Details   cephalexin (KEFLEX) 500 mg capsule Take 1 capsule (500 mg total) by mouth every 12 (twelve) hours for 7 days, Starting Wed 11/30/2022, Until Wed 12/7/2022, Normal         CONTINUE these medications which have NOT CHANGED    Details   ibuprofen (MOTRIN) 400 mg tablet Take 1 tablet (400 mg total) by mouth every 6 (six) hours as needed for mild pain for up to 3 days, Starting Wed 2/5/2020, Until Sat 2/8/2020, Normal      ondansetron (ZOFRAN-ODT) 4 mg disintegrating tablet Take 1 tablet (4 mg total) by mouth every 6 (six) hours as needed for nausea or vomiting, Starting Wed 2/26/2020, Normal           No discharge procedures on file  PDMP Review     None           ED Provider  Attending physically available and evaluated Mat Mejia I managed the patient along with the ED Attending      Electronically Signed by         Isaias Caballero MD  11/30/22 1567

## 2022-11-30 NOTE — DISCHARGE INSTRUCTIONS
Please take antibiotics as prescribed (once in the morning and once at night for 7 days) and follow up with obgyn

## 2022-11-30 NOTE — Clinical Note
Christ[facility] was seen and treated in our emergency department on 11/29/2022  Diagnosis:     Steffany Perez  may return to work on return date  She may return on this date: 12/01/2022         If you have any questions or concerns, please don't hesitate to call        Jeison Calles MD    ______________________________           _______________          _______________  [facility] Representative                              Date                                Time

## 2022-12-01 LAB
C TRACH DNA SPEC QL NAA+PROBE: NEGATIVE
N GONORRHOEA DNA SPEC QL NAA+PROBE: NEGATIVE

## 2023-06-16 ENCOUNTER — VBI (OUTPATIENT)
Dept: ADMINISTRATIVE | Facility: OTHER | Age: 43
End: 2023-06-16

## 2023-08-30 ENCOUNTER — OFFICE VISIT (OUTPATIENT)
Dept: DENTISTRY | Facility: CLINIC | Age: 43
End: 2023-08-30

## 2023-08-30 VITALS — SYSTOLIC BLOOD PRESSURE: 115 MMHG | DIASTOLIC BLOOD PRESSURE: 66 MMHG | HEART RATE: 91 BPM

## 2023-08-30 DIAGNOSIS — Z98.890 HISTORY OF ORAL SURGERY: Primary | ICD-10-CM

## 2023-08-30 DIAGNOSIS — S02.5XXB OPEN FRACTURE OF TOOTH, INITIAL ENCOUNTER: ICD-10-CM

## 2023-08-30 PROCEDURE — D0220 INTRAORAL - PERIAPICAL FIRST RADIOGRAPHIC IMAGE: HCPCS

## 2023-08-30 PROCEDURE — D7140 EXTRACTION, ERUPTED TOOTH OR EXPOSED ROOT (ELEVATION AND/OR FORCEPS REMOVAL): HCPCS

## 2023-08-30 RX ORDER — IBUPROFEN 800 MG/1
800 TABLET ORAL EVERY 6 HOURS PRN
Qty: 28 TABLET | Refills: 0 | Status: SHIPPED | OUTPATIENT
Start: 2023-08-30 | End: 2023-09-06

## 2023-08-30 NOTE — PROGRESS NOTES
Virgie Paige presented for limited evaluation and extraction #4 retained root tips  CC: I broke my tooth a few weeks ago and I think some of it is still in my gums  Pain: 4/10  OE: WNL IE: retained root tip #4  Radiographs: PAXR taken. #4 retained root tip present. Pt understands  Universal Protocol    Other Assisting Provider: Yes, Dr. Shell Vargas consent obtained? YES  Written consent obtained? YES    Risks, benefits and alternatives discussed?: YES    Consent given by: Patient Yes    Time Out  Immediately prior to the procedure a time out was called: YES    Time Out:  1:30pm    A time out verifies correct patient, procedure, equipment, support staff and site/side marked as required. Patient states understanding of procedure being performed: YES    Patient's understanding of procedure matches consent: YES    Procedure consent matches procedure scheduled: YES    Test results available and properly labeled: N/A    Site  Verified with the patient  YES    Radiology Images displayed and confirmed. If images not available, report reviewed:  YES    Required items - Required blood products, implants, devices and special equipment available: YES    Patient identity confirmed:  YES    Oral Surgery    Virgie Paige presents for Ext #4 retained root tips    [address] [place] [address], patient denies any changes. Obtained a direct and personal consent. Risks and complications were explained. Pt agreed and consented. Consent scanned in [facility] center. Pre-Op BP WNL. Administered 1 carp of articane w/ 1:100,000 epi via maxillary infiltration and PDL  Adequate anesthesia obtained, flapped gingiva with 15 blade, elevated, and extracted #4 root tip . Socket irrigated, valsalva performed and was negative for sinus communication, and two 4.0 chromic gut sutures placed. Upon dismissal, patient received POI, gauze, and RX: ibuprofen 800mg TID PRN pain.      NV: Comp exam

## 2024-05-13 ENCOUNTER — OFFICE VISIT (OUTPATIENT)
Dept: DENTISTRY | Facility: CLINIC | Age: 44
End: 2024-05-13

## 2024-05-13 VITALS — TEMPERATURE: 97.8 F

## 2024-05-13 DIAGNOSIS — K08.50 DEFECTIVE DENTAL RESTORATION: Primary | ICD-10-CM

## 2024-05-13 PROCEDURE — D0140 LIMITED ORAL EVALUATION - PROBLEM FOCUSED: HCPCS

## 2024-05-13 PROCEDURE — D2392 RESIN-BASED COMPOSITE - 2 SURFACES, POSTERIOR: HCPCS

## 2024-05-13 PROCEDURE — D0220 INTRAORAL - PERIAPICAL FIRST RADIOGRAPHIC IMAGE: HCPCS

## 2024-05-13 NOTE — PROGRESS NOTES
"Limited Exam    Leilani Diaz 43 y.o. female presents with self to [facility] for Limited exam  PMH reviewed, no changes, ASA I. Significant medical history: NA. Significant allergies: NA. Significant medications: NA.    Chief complaint: \"My filling on the upper left came out and my tooth is sensitive\"    Consent:  Discussed that limited exam focuses on problem area, and same day tx is not guaranteed.  Patient explained to if they wish to have anything else evaluated, they need to return to the practice at which they are a patient of record or receive a comprehensive exam.  Patient understands and consents.    Subjective history:    Onset: 1 week ago   Provocation: Cold, Hot, and Biting   Quality: Sharp   Region: UL   Severity: 3/10   Timing: lasts for a few seconds    Objective clinical findings:   Oral cancer screening: No abnormalities detected   Extraoral exam:   - no abnormal pathologies  Intraoral exam:  - #13MO restoration came out with secondary decay    Radiographs: Select PA #13    Pulp testing:  #13 Cold: 1-2 second response ; Percussion: Normal response ; Palpation: Normal response    Assessment:  #13 Reversible Pulpitis ; Normal apical tissues    Plan:   #13 amalgam restoration came out per pt report. #13 is sensitive. PAXR taken and reviewed. Recurrent decay present with missing restoration. Pt would like restoration completed today due to temperature sensitivity.     Composite Filling    Leilani Diaz presents for composite filling. PMH reviewed, no changes.    Discussed with patient need for RCT if pulp exposure occurs or in future if pulp is inflamed. Pt understands and consents.    Applied topical benzocaine, administered 1 carps 4% articaine 1:100k epi via maxillary infiltration    Prepped tooth #13MO with 245 carbide on high speed. Caries removed with round carbide on slow speed. Placed tofflemire/palodent matrix. Isolation with cotton rolls and dri-angles    Limelite liner placed at deepest portion " of prep. Light cured.     Etch with 37% H2PO4, rinse, dry. Applied Adhese with 20 second scrub once, gentle air dry and light cured for 10s. Restored with Tetric bulk lashon shade A2 and light cured.    Refined with finishing burs, polished with enhance point. Verified occlusion and contacts. Pt left satisfied.    Patient dismissed ambulatory and alert.    NV: comp exam

## 2024-07-10 ENCOUNTER — VBI (OUTPATIENT)
Dept: ADMINISTRATIVE | Facility: OTHER | Age: 44
End: 2024-07-10

## 2024-07-10 NOTE — TELEPHONE ENCOUNTER
07/10/24 2:19 PM     Chart reviewed for Pap Smear (HPV) aka Cervical Cancer Screening ; nothing is submitted to the patient's insurance at this time.     Brigitte Carbone   [facility]

## 2025-03-11 ENCOUNTER — OFFICE VISIT (OUTPATIENT)
Dept: DENTISTRY | Facility: CLINIC | Age: 45
End: 2025-03-11

## 2025-03-11 DIAGNOSIS — K04.7 DENTAL ABSCESS: Primary | ICD-10-CM

## 2025-03-11 DIAGNOSIS — Z01.20 ENCOUNTER FOR DENTAL EXAMINATION: ICD-10-CM

## 2025-03-11 PROCEDURE — D0270 BITEWING - SINGLE RADIOGRAPHIC IMAGE: HCPCS

## 2025-03-11 PROCEDURE — D0220 INTRAORAL - PERIAPICAL FIRST RADIOGRAPHIC IMAGE: HCPCS

## 2025-03-11 PROCEDURE — D0140 LIMITED ORAL EVALUATION - PROBLEM FOCUSED: HCPCS

## 2025-03-11 RX ORDER — AMOXICILLIN 500 MG/1
500 CAPSULE ORAL EVERY 8 HOURS SCHEDULED
Qty: 21 CAPSULE | Refills: 0 | Status: SHIPPED | OUTPATIENT
Start: 2025-03-11 | End: 2025-03-18

## 2025-03-12 NOTE — PROGRESS NOTES
Limited Exam    Leilani Diaz 44 y.o. female presents with self to [facility] for Limited exam  PMH reviewed, no changes, ASA II. Significant medical history: reviewed. Significant allergies: reviewed. Significant medications: reviewed.    Chief complaint:  My upper left tooth is hurting    Consent:  Discussed that limited exam focuses on problem area, and same day tx is not guaranteed.  Patient explained to if they wish to have anything else evaluated, they need to return to the practice at which they are a patient of record or schedule a comprehensive exam afterwards.  Patient understands and consent was given by self via verbal consent.    Subjective history:    Onset: 2 weeks ago.   Provocation: Cold, Biting, Spontaneous, Touching it.   Quality: [place], Dull, Throbbing.   Region: Patient points to tooth #13 .   Severity: 3/10.   Timing: comes and goes, constant.    Objective clinical findings:   Oral cancer screening: normal.   Extraoral exam: no remarkable findings.  Intraoral exam:  Mesial recurrent caries. Unstable periodontium, red erythematous blunted papillae generalized.  .     Radiographs: Single PA - 13 and Single BW - 13 .     Pulp testing:  #13 Cold:  n/a, did not test ; Percussion: Moderate tenderness; Palpation: Moderate pain.    Assessment:  No radiographic PARL. Recurrent decay mesial. Proximity of restoration to pulp and previous history of carious decay, recommend RCT, Post and Core, [place] if patient interested in saving tooth or EXT. Patient opted to try and save tooth.     Plan:   Comp Exam    Referral(s): None needed.  Rx: Amoxicillin.  Comprehensive care disposition: Patient expressed interest in becoming a patient of record with one of the  dental clinics.    Patient dismissed ambulatory and alert.    NOTE: Discussed with patient the need of a full comprehensive exam to treat patient as to comprehensively treat patient and their occlusal needs, and not just problem focused care. Explained to  patient proximity of restoration to pulp and recurrent caries noted on mesial margin with radiolucency consistent over pulpal floor. Suspected endodontic involvement. Cold testing not be able to be done due to patient not wanting tooth to be touched after percussing tooth to assess clinical outcome. Informed patient of possible pulpal involvement based off symptoms reported.    NV: Comp Exam.    Attending: Dr. Hill was present in clinic.

## 2025-05-21 ENCOUNTER — VBI (OUTPATIENT)
Dept: ADMINISTRATIVE | Facility: OTHER | Age: 45
End: 2025-05-21

## 2025-05-21 NOTE — TELEPHONE ENCOUNTER
05/21/25 2:39 PM     Chart reviewed for Pap Smear (HPV) aka Cervical Cancer Screening ; nothing is submitted to the patient's insurance at this time.     Michelle Garibay MA   [facility]

## 2025-08-15 ENCOUNTER — OFFICE VISIT (OUTPATIENT)
Dept: FAMILY MEDICINE CLINIC | Facility: CLINIC | Age: 45
End: 2025-08-15
Payer: COMMERCIAL

## 2025-08-18 ENCOUNTER — APPOINTMENT (OUTPATIENT)
Dept: LAB | Facility: CLINIC | Age: 45
End: 2025-08-18
Payer: COMMERCIAL

## 2025-08-18 DIAGNOSIS — Z13.6 SCREENING FOR CARDIOVASCULAR CONDITION: ICD-10-CM

## 2025-08-18 DIAGNOSIS — Z13.220 SCREENING FOR LIPID DISORDERS: ICD-10-CM

## 2025-08-18 DIAGNOSIS — Z13.1 SCREENING FOR DIABETES MELLITUS: ICD-10-CM

## 2025-08-18 PROBLEM — E78.5 DYSLIPIDEMIA: Status: ACTIVE | Noted: 2025-08-18

## 2025-08-18 LAB
ALBUMIN SERPL BCG-MCNC: 3.8 G/DL (ref 3.5–5)
ALP SERPL-CCNC: 66 U/L (ref 34–104)
ALT SERPL W P-5'-P-CCNC: 11 U/L (ref 7–52)
ANION GAP SERPL CALCULATED.3IONS-SCNC: 10 MMOL/L (ref 4–13)
AST SERPL W P-5'-P-CCNC: 17 U/L (ref 13–39)
BILIRUB SERPL-MCNC: 0.31 MG/DL (ref 0.2–1)
BUN SERPL-MCNC: 12 MG/DL (ref 5–25)
CALCIUM SERPL-MCNC: 8.3 MG/DL (ref 8.4–10.2)
CHLORIDE SERPL-SCNC: 107 MMOL/L (ref 96–108)
CHOLEST SERPL-MCNC: 186 MG/DL (ref ?–200)
CO2 SERPL-SCNC: 22 MMOL/L (ref 21–32)
CREAT SERPL-MCNC: 0.79 MG/DL (ref 0.6–1.3)
EST. AVERAGE GLUCOSE BLD GHB EST-MCNC: 111 MG/DL
GFR SERPL CREATININE-BSD FRML MDRD: 90 ML/MIN/1.73SQ M
GLUCOSE P FAST SERPL-MCNC: 106 MG/DL (ref 65–99)
HBA1C MFR BLD: 5.5 %
HDLC SERPL-MCNC: 49 MG/DL
LDLC SERPL CALC-MCNC: 120 MG/DL (ref 0–100)
NONHDLC SERPL-MCNC: 137 MG/DL
POTASSIUM SERPL-SCNC: 3.3 MMOL/L (ref 3.5–5.3)
PROT SERPL-MCNC: 7 G/DL (ref 6.4–8.4)
SODIUM SERPL-SCNC: 139 MMOL/L (ref 135–147)
TRIGL SERPL-MCNC: 87 MG/DL (ref ?–150)

## 2025-08-18 PROCEDURE — 83036 HEMOGLOBIN GLYCOSYLATED A1C: CPT

## 2025-08-18 PROCEDURE — 36415 COLL VENOUS BLD VENIPUNCTURE: CPT

## 2025-08-18 PROCEDURE — 80053 COMPREHEN METABOLIC PANEL: CPT

## 2025-08-18 PROCEDURE — 80061 LIPID PANEL: CPT

## 2025-08-21 ENCOUNTER — TELEPHONE (OUTPATIENT)
Age: 45
End: 2025-08-21